# Patient Record
Sex: FEMALE | Race: WHITE | Employment: OTHER | URBAN - NONMETROPOLITAN AREA
[De-identification: names, ages, dates, MRNs, and addresses within clinical notes are randomized per-mention and may not be internally consistent; named-entity substitution may affect disease eponyms.]

---

## 2017-06-05 DIAGNOSIS — I10 ESSENTIAL HYPERTENSION: ICD-10-CM

## 2017-06-05 RX ORDER — BENAZEPRIL HYDROCHLORIDE 20 MG/1
20 TABLET ORAL DAILY
Qty: 30 TABLET | Refills: 5 | Status: SHIPPED | OUTPATIENT
Start: 2017-06-05 | End: 2018-02-19 | Stop reason: SDUPTHER

## 2018-02-19 DIAGNOSIS — I10 ESSENTIAL HYPERTENSION: ICD-10-CM

## 2018-02-19 RX ORDER — BENAZEPRIL HYDROCHLORIDE 20 MG/1
20 TABLET ORAL DAILY
Qty: 30 TABLET | Refills: 5 | Status: SHIPPED | OUTPATIENT
Start: 2018-02-19 | End: 2018-07-06 | Stop reason: SDUPTHER

## 2018-06-25 ENCOUNTER — OFFICE VISIT (OUTPATIENT)
Dept: FAMILY MEDICINE CLINIC | Age: 68
End: 2018-06-25
Payer: MEDICARE

## 2018-06-25 VITALS
HEART RATE: 107 BPM | TEMPERATURE: 98.5 F | SYSTOLIC BLOOD PRESSURE: 150 MMHG | DIASTOLIC BLOOD PRESSURE: 80 MMHG | HEIGHT: 59 IN | OXYGEN SATURATION: 98 % | BODY MASS INDEX: 23.26 KG/M2 | WEIGHT: 115.4 LBS

## 2018-06-25 DIAGNOSIS — S86.911A KNEE STRAIN, RIGHT, INITIAL ENCOUNTER: Primary | ICD-10-CM

## 2018-06-25 DIAGNOSIS — Z12.11 COLON CANCER SCREENING: ICD-10-CM

## 2018-06-25 DIAGNOSIS — S82.141A CLOSED FRACTURE OF RIGHT TIBIAL PLATEAU, INITIAL ENCOUNTER: Primary | ICD-10-CM

## 2018-06-25 DIAGNOSIS — Z91.81 AT HIGH RISK FOR FALLS: ICD-10-CM

## 2018-06-25 PROCEDURE — G8420 CALC BMI NORM PARAMETERS: HCPCS | Performed by: NURSE PRACTITIONER

## 2018-06-25 PROCEDURE — 3017F COLORECTAL CA SCREEN DOC REV: CPT | Performed by: NURSE PRACTITIONER

## 2018-06-25 PROCEDURE — G8400 PT W/DXA NO RESULTS DOC: HCPCS | Performed by: NURSE PRACTITIONER

## 2018-06-25 PROCEDURE — 4040F PNEUMOC VAC/ADMIN/RCVD: CPT | Performed by: NURSE PRACTITIONER

## 2018-06-25 PROCEDURE — G8427 DOCREV CUR MEDS BY ELIG CLIN: HCPCS | Performed by: NURSE PRACTITIONER

## 2018-06-25 PROCEDURE — 4004F PT TOBACCO SCREEN RCVD TLK: CPT | Performed by: NURSE PRACTITIONER

## 2018-06-25 PROCEDURE — 1123F ACP DISCUSS/DSCN MKR DOCD: CPT | Performed by: NURSE PRACTITIONER

## 2018-06-25 PROCEDURE — 1090F PRES/ABSN URINE INCON ASSESS: CPT | Performed by: NURSE PRACTITIONER

## 2018-06-25 PROCEDURE — 99213 OFFICE O/P EST LOW 20 MIN: CPT | Performed by: NURSE PRACTITIONER

## 2018-06-25 ASSESSMENT — ENCOUNTER SYMPTOMS
SHORTNESS OF BREATH: 0
COUGH: 0

## 2018-06-25 ASSESSMENT — PATIENT HEALTH QUESTIONNAIRE - PHQ9
1. LITTLE INTEREST OR PLEASURE IN DOING THINGS: 0
SUM OF ALL RESPONSES TO PHQ QUESTIONS 1-9: 1
SUM OF ALL RESPONSES TO PHQ9 QUESTIONS 1 & 2: 1
2. FEELING DOWN, DEPRESSED OR HOPELESS: 1

## 2018-07-06 ENCOUNTER — OFFICE VISIT (OUTPATIENT)
Dept: FAMILY MEDICINE CLINIC | Age: 68
End: 2018-07-06
Payer: MEDICARE

## 2018-07-06 VITALS
WEIGHT: 114.6 LBS | HEART RATE: 101 BPM | OXYGEN SATURATION: 99 % | DIASTOLIC BLOOD PRESSURE: 90 MMHG | BODY MASS INDEX: 23.1 KG/M2 | HEIGHT: 59 IN | SYSTOLIC BLOOD PRESSURE: 162 MMHG

## 2018-07-06 DIAGNOSIS — I10 ESSENTIAL HYPERTENSION: ICD-10-CM

## 2018-07-06 DIAGNOSIS — F32.A DEPRESSION, UNSPECIFIED DEPRESSION TYPE: ICD-10-CM

## 2018-07-06 DIAGNOSIS — Z12.39 SCREENING FOR MALIGNANT NEOPLASM OF BREAST: ICD-10-CM

## 2018-07-06 DIAGNOSIS — Z78.0 POSTMENOPAUSAL: ICD-10-CM

## 2018-07-06 DIAGNOSIS — S82.141A TIBIAL PLATEAU FRACTURE, RIGHT, CLOSED, INITIAL ENCOUNTER: Primary | ICD-10-CM

## 2018-07-06 LAB
ALBUMIN SERPL-MCNC: 3.8 G/DL (ref 3.9–4.9)
ALP BLD-CCNC: 122 U/L (ref 40–130)
ALT SERPL-CCNC: 8 U/L (ref 0–33)
ANION GAP SERPL CALCULATED.3IONS-SCNC: 15 MEQ/L (ref 7–13)
AST SERPL-CCNC: 21 U/L (ref 0–35)
BILIRUB SERPL-MCNC: 0.6 MG/DL (ref 0–1.2)
BUN BLDV-MCNC: 13 MG/DL (ref 8–23)
CALCIUM SERPL-MCNC: 9.5 MG/DL (ref 8.6–10.2)
CHLORIDE BLD-SCNC: 103 MEQ/L (ref 98–107)
CHOLESTEROL, TOTAL: 220 MG/DL (ref 0–199)
CO2: 21 MEQ/L (ref 22–29)
CREAT SERPL-MCNC: 0.45 MG/DL (ref 0.5–0.9)
GFR AFRICAN AMERICAN: >60
GFR NON-AFRICAN AMERICAN: >60
GLOBULIN: 3.2 G/DL (ref 2.3–3.5)
GLUCOSE BLD-MCNC: 92 MG/DL (ref 74–109)
HCT VFR BLD CALC: 42 % (ref 37–47)
HDLC SERPL-MCNC: 61 MG/DL (ref 40–59)
HEMOGLOBIN: 13.7 G/DL (ref 12–16)
LDL CHOLESTEROL CALCULATED: 132 MG/DL (ref 0–129)
MCH RBC QN AUTO: 34.4 PG (ref 27–31.3)
MCHC RBC AUTO-ENTMCNC: 32.6 % (ref 33–37)
MCV RBC AUTO: 105.7 FL (ref 82–100)
PDW BLD-RTO: 14.8 % (ref 11.5–14.5)
PLATELET # BLD: 196 K/UL (ref 130–400)
POTASSIUM SERPL-SCNC: 3.9 MEQ/L (ref 3.5–5.1)
RBC # BLD: 3.97 M/UL (ref 4.2–5.4)
SODIUM BLD-SCNC: 139 MEQ/L (ref 132–144)
TOTAL PROTEIN: 7 G/DL (ref 6.4–8.1)
TRIGL SERPL-MCNC: 137 MG/DL (ref 0–200)
TSH SERPL DL<=0.05 MIU/L-ACNC: 1.15 UIU/ML (ref 0.27–4.2)
WBC # BLD: 2.7 K/UL (ref 4.8–10.8)

## 2018-07-06 PROCEDURE — 1090F PRES/ABSN URINE INCON ASSESS: CPT | Performed by: FAMILY MEDICINE

## 2018-07-06 PROCEDURE — 4040F PNEUMOC VAC/ADMIN/RCVD: CPT | Performed by: FAMILY MEDICINE

## 2018-07-06 PROCEDURE — G8427 DOCREV CUR MEDS BY ELIG CLIN: HCPCS | Performed by: FAMILY MEDICINE

## 2018-07-06 PROCEDURE — 99214 OFFICE O/P EST MOD 30 MIN: CPT | Performed by: FAMILY MEDICINE

## 2018-07-06 PROCEDURE — 4004F PT TOBACCO SCREEN RCVD TLK: CPT | Performed by: FAMILY MEDICINE

## 2018-07-06 PROCEDURE — G8420 CALC BMI NORM PARAMETERS: HCPCS | Performed by: FAMILY MEDICINE

## 2018-07-06 PROCEDURE — 1123F ACP DISCUSS/DSCN MKR DOCD: CPT | Performed by: FAMILY MEDICINE

## 2018-07-06 PROCEDURE — 3017F COLORECTAL CA SCREEN DOC REV: CPT | Performed by: FAMILY MEDICINE

## 2018-07-06 PROCEDURE — G8400 PT W/DXA NO RESULTS DOC: HCPCS | Performed by: FAMILY MEDICINE

## 2018-07-06 RX ORDER — ESCITALOPRAM OXALATE 10 MG/1
10 TABLET ORAL DAILY
Qty: 30 TABLET | Refills: 5 | Status: SHIPPED | OUTPATIENT
Start: 2018-07-06 | End: 2019-04-08 | Stop reason: SDUPTHER

## 2018-07-06 RX ORDER — BENAZEPRIL HYDROCHLORIDE 20 MG/1
20 TABLET ORAL 2 TIMES DAILY
Qty: 60 TABLET | Refills: 5 | Status: SHIPPED | OUTPATIENT
Start: 2018-07-06 | End: 2019-04-08 | Stop reason: SDUPTHER

## 2018-07-06 RX ORDER — HYDROCODONE BITARTRATE AND ACETAMINOPHEN 5; 325 MG/1; MG/1
1 TABLET ORAL EVERY 6 HOURS PRN
Qty: 28 TABLET | Refills: 0 | Status: SHIPPED | OUTPATIENT
Start: 2018-07-06 | End: 2018-07-13

## 2018-07-27 ENCOUNTER — HOSPITAL ENCOUNTER (OUTPATIENT)
Dept: WOMENS IMAGING | Age: 68
Discharge: HOME | End: 2018-07-29
Payer: MEDICARE

## 2018-07-27 DIAGNOSIS — Z12.39 SCREENING FOR MALIGNANT NEOPLASM OF BREAST: ICD-10-CM

## 2018-07-27 DIAGNOSIS — Z78.0 POSTMENOPAUSAL: ICD-10-CM

## 2018-07-27 PROCEDURE — 77067 SCR MAMMO BI INCL CAD: CPT

## 2018-07-27 PROCEDURE — 77080 DXA BONE DENSITY AXIAL: CPT

## 2018-08-29 PROBLEM — M81.0 OSTEOPOROSIS: Status: ACTIVE | Noted: 2018-08-29

## 2018-08-30 ENCOUNTER — HOSPITAL ENCOUNTER (OUTPATIENT)
Dept: INFUSION THERAPY | Age: 68
Setting detail: INFUSION SERIES
Discharge: HOME | End: 2018-08-30
Payer: MEDICARE

## 2018-08-30 VITALS
HEART RATE: 72 BPM | RESPIRATION RATE: 18 BRPM | DIASTOLIC BLOOD PRESSURE: 76 MMHG | SYSTOLIC BLOOD PRESSURE: 154 MMHG | TEMPERATURE: 98.2 F

## 2018-08-30 DIAGNOSIS — M81.0 OSTEOPOROSIS, UNSPECIFIED OSTEOPOROSIS TYPE, UNSPECIFIED PATHOLOGICAL FRACTURE PRESENCE: ICD-10-CM

## 2018-08-30 PROCEDURE — 96401 CHEMO ANTI-NEOPL SQ/IM: CPT

## 2018-08-30 PROCEDURE — 6360000002 HC RX W HCPCS: Performed by: FAMILY MEDICINE

## 2018-08-30 RX ADMIN — DENOSUMAB 60 MG: 60 INJECTION SUBCUTANEOUS at 13:15

## 2018-08-30 NOTE — FLOWSHEET NOTE
Patient to the floor ambulatory for a prolia injection. Vital signs taken. Denies any discomfort. Education given. Call light is within reach.

## 2018-08-30 NOTE — FLOWSHEET NOTE
Injection given in right arm. Tolerated well. Ambulated off of the unit. All equipment used in the care for this patient has been cleaned.

## 2019-02-02 ENCOUNTER — OFFICE VISIT (OUTPATIENT)
Dept: FAMILY MEDICINE CLINIC | Age: 69
End: 2019-02-02
Payer: MEDICARE

## 2019-02-02 VITALS
SYSTOLIC BLOOD PRESSURE: 136 MMHG | BODY MASS INDEX: 22.22 KG/M2 | TEMPERATURE: 97.6 F | DIASTOLIC BLOOD PRESSURE: 80 MMHG | OXYGEN SATURATION: 98 % | RESPIRATION RATE: 16 BRPM | WEIGHT: 110.2 LBS | HEIGHT: 59 IN | HEART RATE: 101 BPM

## 2019-02-02 DIAGNOSIS — J06.9 ACUTE UPPER RESPIRATORY INFECTION: ICD-10-CM

## 2019-02-02 DIAGNOSIS — J02.9 SORE THROAT: ICD-10-CM

## 2019-02-02 DIAGNOSIS — J02.9 ACUTE PHARYNGITIS, UNSPECIFIED ETIOLOGY: Primary | ICD-10-CM

## 2019-02-02 DIAGNOSIS — H61.21 RIGHT EAR IMPACTED CERUMEN: ICD-10-CM

## 2019-02-02 DIAGNOSIS — H92.01 RIGHT EAR PAIN: ICD-10-CM

## 2019-02-02 LAB — S PYO AG THROAT QL: NORMAL

## 2019-02-02 PROCEDURE — 99213 OFFICE O/P EST LOW 20 MIN: CPT | Performed by: NURSE PRACTITIONER

## 2019-02-02 PROCEDURE — 69210 REMOVE IMPACTED EAR WAX UNI: CPT | Performed by: NURSE PRACTITIONER

## 2019-02-02 PROCEDURE — 87880 STREP A ASSAY W/OPTIC: CPT | Performed by: NURSE PRACTITIONER

## 2019-02-02 RX ORDER — OFLOXACIN 3 MG/ML
5 SOLUTION AURICULAR (OTIC) 2 TIMES DAILY
Qty: 1 BOTTLE | Refills: 0 | Status: SHIPPED | OUTPATIENT
Start: 2019-02-02 | End: 2019-05-09

## 2019-02-02 RX ORDER — CEFDINIR 300 MG/1
300 CAPSULE ORAL 2 TIMES DAILY
Qty: 20 CAPSULE | Refills: 0 | Status: SHIPPED | OUTPATIENT
Start: 2019-02-02 | End: 2019-02-12

## 2019-02-02 ASSESSMENT — ENCOUNTER SYMPTOMS
SHORTNESS OF BREATH: 0
WHEEZING: 0
COUGH: 1
SINUS PRESSURE: 1
RHINORRHEA: 0
SINUS PAIN: 0
SORE THROAT: 1

## 2019-02-05 LAB — THROAT CULTURE: NORMAL

## 2019-04-08 DIAGNOSIS — F32.A DEPRESSION, UNSPECIFIED DEPRESSION TYPE: ICD-10-CM

## 2019-04-08 DIAGNOSIS — I10 ESSENTIAL HYPERTENSION: ICD-10-CM

## 2019-04-08 RX ORDER — ESCITALOPRAM OXALATE 10 MG/1
10 TABLET ORAL DAILY
Qty: 30 TABLET | Refills: 5 | Status: SHIPPED | OUTPATIENT
Start: 2019-04-08 | End: 2019-09-27 | Stop reason: SDUPTHER

## 2019-04-08 RX ORDER — BENAZEPRIL HYDROCHLORIDE 20 MG/1
TABLET ORAL
Qty: 60 TABLET | Refills: 5 | Status: SHIPPED | OUTPATIENT
Start: 2019-04-08 | End: 2019-05-22 | Stop reason: SDUPTHER

## 2019-05-09 ENCOUNTER — OFFICE VISIT (OUTPATIENT)
Dept: FAMILY MEDICINE CLINIC | Age: 69
End: 2019-05-09
Payer: MEDICARE

## 2019-05-09 VITALS
SYSTOLIC BLOOD PRESSURE: 130 MMHG | OXYGEN SATURATION: 98 % | WEIGHT: 112.4 LBS | HEIGHT: 59 IN | DIASTOLIC BLOOD PRESSURE: 82 MMHG | BODY MASS INDEX: 22.66 KG/M2 | HEART RATE: 90 BPM

## 2019-05-09 DIAGNOSIS — G89.29 CHRONIC PAIN OF BOTH KNEES: ICD-10-CM

## 2019-05-09 DIAGNOSIS — I10 ESSENTIAL HYPERTENSION: Primary | ICD-10-CM

## 2019-05-09 DIAGNOSIS — M81.0 OSTEOPOROSIS, UNSPECIFIED OSTEOPOROSIS TYPE, UNSPECIFIED PATHOLOGICAL FRACTURE PRESENCE: ICD-10-CM

## 2019-05-09 DIAGNOSIS — Z12.11 COLON CANCER SCREENING: ICD-10-CM

## 2019-05-09 DIAGNOSIS — I10 ESSENTIAL HYPERTENSION: ICD-10-CM

## 2019-05-09 DIAGNOSIS — M25.561 CHRONIC PAIN OF BOTH KNEES: ICD-10-CM

## 2019-05-09 DIAGNOSIS — F32.A DEPRESSION, UNSPECIFIED DEPRESSION TYPE: ICD-10-CM

## 2019-05-09 DIAGNOSIS — M25.562 CHRONIC PAIN OF BOTH KNEES: ICD-10-CM

## 2019-05-09 LAB
ALBUMIN SERPL-MCNC: 4 G/DL (ref 3.5–4.6)
ALP BLD-CCNC: 82 U/L (ref 40–130)
ALT SERPL-CCNC: 6 U/L (ref 0–33)
ANION GAP SERPL CALCULATED.3IONS-SCNC: 16 MEQ/L (ref 9–15)
AST SERPL-CCNC: 19 U/L (ref 0–35)
BILIRUB SERPL-MCNC: 0.5 MG/DL (ref 0.2–0.7)
BUN BLDV-MCNC: 15 MG/DL (ref 8–23)
CALCIUM SERPL-MCNC: 9.1 MG/DL (ref 8.5–9.9)
CHLORIDE BLD-SCNC: 103 MEQ/L (ref 95–107)
CHOLESTEROL, TOTAL: 193 MG/DL (ref 0–199)
CO2: 23 MEQ/L (ref 20–31)
CREAT SERPL-MCNC: 0.63 MG/DL (ref 0.5–0.9)
GFR AFRICAN AMERICAN: >60
GFR NON-AFRICAN AMERICAN: >60
GLOBULIN: 2.8 G/DL (ref 2.3–3.5)
GLUCOSE BLD-MCNC: 91 MG/DL (ref 70–99)
HCT VFR BLD CALC: 38.8 % (ref 37–47)
HDLC SERPL-MCNC: 58 MG/DL (ref 40–59)
HEMOGLOBIN: 12.7 G/DL (ref 12–16)
LDL CHOLESTEROL CALCULATED: 105 MG/DL (ref 0–129)
MCH RBC QN AUTO: 35.6 PG (ref 27–31.3)
MCHC RBC AUTO-ENTMCNC: 32.8 % (ref 33–37)
MCV RBC AUTO: 108.7 FL (ref 82–100)
PDW BLD-RTO: 15.6 % (ref 11.5–14.5)
PLATELET # BLD: 157 K/UL (ref 130–400)
POTASSIUM SERPL-SCNC: 4.6 MEQ/L (ref 3.4–4.9)
RBC # BLD: 3.57 M/UL (ref 4.2–5.4)
SODIUM BLD-SCNC: 142 MEQ/L (ref 135–144)
TOTAL PROTEIN: 6.8 G/DL (ref 6.3–8)
TRIGL SERPL-MCNC: 150 MG/DL (ref 0–150)
VITAMIN D 25-HYDROXY: 13.9 NG/ML (ref 30–100)
WBC # BLD: 2.7 K/UL (ref 4.8–10.8)

## 2019-05-09 PROCEDURE — 1123F ACP DISCUSS/DSCN MKR DOCD: CPT | Performed by: FAMILY MEDICINE

## 2019-05-09 PROCEDURE — 4004F PT TOBACCO SCREEN RCVD TLK: CPT | Performed by: FAMILY MEDICINE

## 2019-05-09 PROCEDURE — G8420 CALC BMI NORM PARAMETERS: HCPCS | Performed by: FAMILY MEDICINE

## 2019-05-09 PROCEDURE — 99214 OFFICE O/P EST MOD 30 MIN: CPT | Performed by: FAMILY MEDICINE

## 2019-05-09 PROCEDURE — 1090F PRES/ABSN URINE INCON ASSESS: CPT | Performed by: FAMILY MEDICINE

## 2019-05-09 PROCEDURE — 3017F COLORECTAL CA SCREEN DOC REV: CPT | Performed by: FAMILY MEDICINE

## 2019-05-09 PROCEDURE — G8427 DOCREV CUR MEDS BY ELIG CLIN: HCPCS | Performed by: FAMILY MEDICINE

## 2019-05-09 PROCEDURE — 4040F PNEUMOC VAC/ADMIN/RCVD: CPT | Performed by: FAMILY MEDICINE

## 2019-05-09 PROCEDURE — G8399 PT W/DXA RESULTS DOCUMENT: HCPCS | Performed by: FAMILY MEDICINE

## 2019-05-09 ASSESSMENT — PATIENT HEALTH QUESTIONNAIRE - PHQ9
SUM OF ALL RESPONSES TO PHQ9 QUESTIONS 1 & 2: 0
2. FEELING DOWN, DEPRESSED OR HOPELESS: 0
1. LITTLE INTEREST OR PLEASURE IN DOING THINGS: 0
SUM OF ALL RESPONSES TO PHQ QUESTIONS 1-9: 0
SUM OF ALL RESPONSES TO PHQ QUESTIONS 1-9: 0

## 2019-05-09 NOTE — PROGRESS NOTES
Chief Complaint   Patient presents with    Hypertension     check up       HPI:  Mark Land is a 80 y.o. female    checkup  Hx depression and HTN    Doing ok on current meds    Ongoing knee pain    She has been feeling pretty good lately she states    Hx vertigo, treats with dramamine    Infrequent doctor visits      Past Medical History:   Diagnosis Date    Headache     Hypertension     Vertigo      Past Surgical History:   Procedure Laterality Date    ELBOW SURGERY Right 8/2012    Dr. Jimmie Scales       History reviewed. No pertinent family history. Social History     Socioeconomic History    Marital status:      Spouse name: None    Number of children: None    Years of education: None    Highest education level: None   Occupational History    None   Social Needs    Financial resource strain: None    Food insecurity:     Worry: None     Inability: None    Transportation needs:     Medical: None     Non-medical: None   Tobacco Use    Smoking status: Current Some Day Smoker     Packs/day: 0.25     Years: 3.00     Pack years: 0.75    Smokeless tobacco: Never Used   Substance and Sexual Activity    Alcohol use:  Yes     Alcohol/week: 0.0 oz    Drug use: No    Sexual activity: None   Lifestyle    Physical activity:     Days per week: None     Minutes per session: None    Stress: None   Relationships    Social connections:     Talks on phone: None     Gets together: None     Attends [employer]ism service: None     Active member of club or organization: None     Attends meetings of clubs or organizations: None     Relationship status: None    Intimate partner violence:     Fear of current or ex partner: None     Emotionally abused: None     Physically abused: None     Forced sexual activity: None   Other Topics Concern    None   Social History Narrative    None     Current Outpatient Medications   Medication Sig Dispense Refill    Handicap Placard MISC by Does not apply route Exp 5 years 1 each 0    escitalopram (LEXAPRO) 10 MG tablet Take 1 tablet by mouth daily 30 tablet 5    benazepril (LOTENSIN) 20 MG tablet TAKE 1 TABLET BY MOUTH TWICE DAILY 60 tablet 5    Handicap Placard MISC by Does not apply route Exp 1 year 1 each 0    Aspirin-Acetaminophen-Caffeine (EXCEDRIN MIGRAINE PO) Take by mouth      naproxen (NAPROSYN) 250 MG tablet Take 250 mg by mouth 2 times daily (with meals)       No current facility-administered medications for this visit. No Known Allergies    Review of Systems:  Psych per HPI   General ROS: negative for - chills, fatigue, fever, malaise, weight gain or weight loss  Respiratory ROS: no cough, shortness of breath, or wheezing  Cardiovascular ROS: no chest pain or dyspnea on exertion  Gastrointestinal ROS: no abdominal pain, change in bowel habits, or black or bloody stools  Genito-Urinary ROS: per HPI  Musculoskeletal ROS: occ aches and pains/headache, chronic right elbow pain s/p replacement  Neurological ROS: negative for - behavioral changes, memory loss, numbness/tingling, tremors or weakness    In general patient otherwise reports feeling well. Physical Exam:  /82 (Site: Left Upper Arm)   Pulse 90   Ht 4' 11\" (1.499 m)   Wt 112 lb 6.4 oz (51 kg)   SpO2 98%   Breastfeeding? No   BMI 22.70 kg/m²     Gen: Well, NAD, Alert, Oriented x 3   HEENT: EOMI, eyes clear, MMM  Skin: without rash or jaundice  Neck: no significant lymphadenopathy or thyromegaly  Lungs: CTA B w/out Rales/Wheezes/Rhonchi, Good respiratory effort   Heart: RRR, S1S2, w/out M/R/G, non-displaced PMI   Abdomen: Soft NT/ND, w/out R/G, w/ +BSx4   Ext: No C/C/E Bilaterally. Neuro: Neurovascularly intact w/ Sensory/Motor intact UE/LE Bilaterally. S/p right elbow replacement  Psych: euthymic,admits depression, no SI/HI      A&P   Diagnosis Orders   1. Essential hypertension  CBC    Comprehensive Metabolic Panel    Lipid Panel   2.  Depression, unspecified depression type 3. Colon cancer screening  COLOGUARD   4. Chronic pain of both knees  Handicap Placard MISC   5.  Osteoporosis, unspecified osteoporosis type, unspecified pathological fracture presence         Continue lexapro 10mg  Benazepril 20mg  Fasting labs and screens  Consider counseling if needed    Due for Kelvin Mar MD

## 2019-05-22 DIAGNOSIS — I10 ESSENTIAL HYPERTENSION: ICD-10-CM

## 2019-05-22 RX ORDER — BENAZEPRIL HYDROCHLORIDE 20 MG/1
TABLET ORAL
Qty: 60 TABLET | Refills: 5 | Status: SHIPPED | OUTPATIENT
Start: 2019-05-22 | End: 2019-10-28 | Stop reason: SDUPTHER

## 2019-05-30 ENCOUNTER — HOSPITAL ENCOUNTER (OUTPATIENT)
Dept: INFUSION THERAPY | Age: 69
Setting detail: INFUSION SERIES
Discharge: HOME | End: 2019-05-30
Payer: MEDICARE

## 2019-05-30 VITALS
DIASTOLIC BLOOD PRESSURE: 71 MMHG | TEMPERATURE: 97.5 F | RESPIRATION RATE: 18 BRPM | SYSTOLIC BLOOD PRESSURE: 166 MMHG | HEART RATE: 81 BPM

## 2019-05-30 DIAGNOSIS — M81.0 OSTEOPOROSIS, UNSPECIFIED OSTEOPOROSIS TYPE, UNSPECIFIED PATHOLOGICAL FRACTURE PRESENCE: Primary | ICD-10-CM

## 2019-05-30 PROCEDURE — 96401 CHEMO ANTI-NEOPL SQ/IM: CPT

## 2019-05-30 PROCEDURE — 6360000002 HC RX W HCPCS: Performed by: FAMILY MEDICINE

## 2019-05-30 RX ADMIN — DENOSUMAB 60 MG: 60 INJECTION SUBCUTANEOUS at 10:13

## 2019-05-30 NOTE — PROGRESS NOTES
Injection given left upper arm  Tolerated well  Left unit walking  All equipment used in the care for this patient has been cleaned.

## 2019-06-06 DIAGNOSIS — R19.5 POSITIVE COLORECTAL CANCER SCREENING USING COLOGUARD TEST: Primary | ICD-10-CM

## 2019-06-06 DIAGNOSIS — Z12.11 COLON CANCER SCREENING: ICD-10-CM

## 2019-07-19 ENCOUNTER — TELEPHONE (OUTPATIENT)
Dept: FAMILY MEDICINE CLINIC | Age: 69
End: 2019-07-19

## 2019-09-27 DIAGNOSIS — F32.A DEPRESSION, UNSPECIFIED DEPRESSION TYPE: ICD-10-CM

## 2019-09-30 RX ORDER — ESCITALOPRAM OXALATE 10 MG/1
10 TABLET ORAL DAILY
Qty: 30 TABLET | Refills: 5 | Status: SHIPPED | OUTPATIENT
Start: 2019-09-30 | End: 2019-10-25 | Stop reason: SDUPTHER

## 2019-10-25 DIAGNOSIS — F32.A DEPRESSION, UNSPECIFIED DEPRESSION TYPE: ICD-10-CM

## 2019-10-25 RX ORDER — ESCITALOPRAM OXALATE 10 MG/1
10 TABLET ORAL DAILY
Qty: 30 TABLET | Refills: 5 | Status: SHIPPED | OUTPATIENT
Start: 2019-10-25 | End: 2020-05-04

## 2019-10-28 DIAGNOSIS — I10 ESSENTIAL HYPERTENSION: ICD-10-CM

## 2019-10-28 RX ORDER — BENAZEPRIL HYDROCHLORIDE 20 MG/1
TABLET ORAL
Qty: 60 TABLET | Refills: 5 | Status: SHIPPED | OUTPATIENT
Start: 2019-10-28 | End: 2020-05-04

## 2019-12-02 ENCOUNTER — HOSPITAL ENCOUNTER (OUTPATIENT)
Dept: INFUSION THERAPY | Age: 69
Setting detail: INFUSION SERIES
Discharge: HOME | End: 2019-12-02
Payer: MEDICARE

## 2019-12-02 VITALS
SYSTOLIC BLOOD PRESSURE: 168 MMHG | DIASTOLIC BLOOD PRESSURE: 95 MMHG | TEMPERATURE: 97.7 F | HEART RATE: 76 BPM | RESPIRATION RATE: 16 BRPM

## 2019-12-02 DIAGNOSIS — M81.0 OSTEOPOROSIS, UNSPECIFIED OSTEOPOROSIS TYPE, UNSPECIFIED PATHOLOGICAL FRACTURE PRESENCE: Primary | ICD-10-CM

## 2019-12-02 PROCEDURE — 6360000002 HC RX W HCPCS: Performed by: FAMILY MEDICINE

## 2019-12-02 PROCEDURE — 96401 CHEMO ANTI-NEOPL SQ/IM: CPT

## 2019-12-02 RX ADMIN — DENOSUMAB 60 MG: 60 INJECTION SUBCUTANEOUS at 10:19

## 2020-05-04 RX ORDER — BENAZEPRIL HYDROCHLORIDE 20 MG/1
TABLET ORAL
Qty: 60 TABLET | Refills: 5 | Status: SHIPPED | OUTPATIENT
Start: 2020-05-04 | End: 2020-05-12 | Stop reason: SDUPTHER

## 2020-05-04 RX ORDER — ESCITALOPRAM OXALATE 10 MG/1
10 TABLET ORAL DAILY
Qty: 30 TABLET | Refills: 5 | Status: SHIPPED | OUTPATIENT
Start: 2020-05-04 | End: 2020-05-12 | Stop reason: SDUPTHER

## 2020-05-12 ENCOUNTER — VIRTUAL VISIT (OUTPATIENT)
Dept: FAMILY MEDICINE CLINIC | Age: 70
End: 2020-05-12
Payer: MEDICARE

## 2020-05-12 VITALS
WEIGHT: 105 LBS | SYSTOLIC BLOOD PRESSURE: 141 MMHG | BODY MASS INDEX: 21.17 KG/M2 | HEIGHT: 59 IN | HEART RATE: 86 BPM | DIASTOLIC BLOOD PRESSURE: 76 MMHG

## 2020-05-12 PROCEDURE — G8420 CALC BMI NORM PARAMETERS: HCPCS | Performed by: FAMILY MEDICINE

## 2020-05-12 PROCEDURE — 99214 OFFICE O/P EST MOD 30 MIN: CPT | Performed by: FAMILY MEDICINE

## 2020-05-12 PROCEDURE — 1090F PRES/ABSN URINE INCON ASSESS: CPT | Performed by: FAMILY MEDICINE

## 2020-05-12 PROCEDURE — G8427 DOCREV CUR MEDS BY ELIG CLIN: HCPCS | Performed by: FAMILY MEDICINE

## 2020-05-12 PROCEDURE — 4040F PNEUMOC VAC/ADMIN/RCVD: CPT | Performed by: FAMILY MEDICINE

## 2020-05-12 PROCEDURE — G8399 PT W/DXA RESULTS DOCUMENT: HCPCS | Performed by: FAMILY MEDICINE

## 2020-05-12 PROCEDURE — 3017F COLORECTAL CA SCREEN DOC REV: CPT | Performed by: FAMILY MEDICINE

## 2020-05-12 PROCEDURE — 1123F ACP DISCUSS/DSCN MKR DOCD: CPT | Performed by: FAMILY MEDICINE

## 2020-05-12 PROCEDURE — 4004F PT TOBACCO SCREEN RCVD TLK: CPT | Performed by: FAMILY MEDICINE

## 2020-05-12 RX ORDER — BENAZEPRIL HYDROCHLORIDE 20 MG/1
TABLET ORAL
Qty: 60 TABLET | Refills: 5 | Status: CANCELLED | OUTPATIENT
Start: 2020-05-12

## 2020-05-12 RX ORDER — IBUPROFEN 800 MG/1
800 TABLET ORAL
Qty: 90 TABLET | Refills: 5 | Status: SHIPPED | OUTPATIENT
Start: 2020-05-12 | End: 2020-10-02

## 2020-05-12 RX ORDER — BENAZEPRIL HYDROCHLORIDE 20 MG/1
TABLET ORAL
Qty: 60 TABLET | Refills: 5 | Status: SHIPPED | OUTPATIENT
Start: 2020-05-12 | End: 2020-10-30

## 2020-05-12 RX ORDER — ESCITALOPRAM OXALATE 10 MG/1
10 TABLET ORAL DAILY
Qty: 30 TABLET | Refills: 5 | Status: SHIPPED | OUTPATIENT
Start: 2020-05-12 | End: 2020-11-02

## 2020-05-12 RX ORDER — HYDROCHLOROTHIAZIDE 25 MG/1
25 TABLET ORAL EVERY MORNING
Qty: 30 TABLET | Refills: 5 | Status: SHIPPED | OUTPATIENT
Start: 2020-05-12 | End: 2020-10-02

## 2020-05-12 NOTE — PROGRESS NOTES
2020    TELEHEALTH EVALUATION -- Audio/Visual (During PUYIP-91 public health emergency)    Due to Mat[place] 19 outbreak, patient's office visit was converted to a virtual visit. Patient was contacted and agreed to proceed with a virtual visit via [email or web address]  The risks and benefits of converting to a virtual visit were discussed in light of the current infectious disease epidemic. Patient also understood that insurance coverage and co-pays are up to their individual insurance plans.     HPI:    Lindsay Vasquez (:  1950) has requested an audio/video evaluation for the following concern(s):  Chief Complaint   Patient presents with    Hypertension     check up    Discuss Medications     excedrin and naptoxen nit working taking 6 ibuprofen to be able to walk, friend gave her 600mg ibuprofen adnd that helped more       checkup  Hx depression and HTN     Doing ok on current meds     Ongoing knee pain  Would like 800mg motrin rx     She has been feeling pretty good lately she states     Hx vertigo, treats with dramamine     Infrequent doctor visits    bp running high    Patient Active Problem List   Diagnosis    Osteoporosis    Essential hypertension    Depression    Chronic pain of both knees     Past Medical History:   Diagnosis Date    Headache     Hypertension     Vertigo          Review of Systems       Review of Systems:   General ROS: negative for - nausea, vomiting, chills, fatigue, fever, malaise, weight gain or weight loss  Respiratory ROS: no cough, shortness of breath, or wheezing  Cardiovascular ROS: no chest pain or dyspnea on exertion  Gastrointestinal ROS: no abdominal pain, change in bowel habits, or black or bloody stools  Genito-Urinary ROS: no dysuria, trouble voiding, or hematuria  Musculoskeletal ROS: negative for - gait disturbance, joint pain or joint stiffness  Neurological ROS: negative for - behavioral changes, memory loss, numbness/tingling, tremors or weakness    In general

## 2020-08-11 ENCOUNTER — TELEPHONE (OUTPATIENT)
Dept: FAMILY MEDICINE CLINIC | Age: 70
End: 2020-08-11

## 2020-08-11 NOTE — TELEPHONE ENCOUNTER
[address] calling to see if pt is still getting Prolia injections.  Please call back [phone]

## 2020-08-14 ENCOUNTER — HOSPITAL ENCOUNTER (OUTPATIENT)
Dept: WOMENS IMAGING | Age: 70
Discharge: HOME | End: 2020-08-16
Payer: MEDICARE

## 2020-08-14 PROCEDURE — 77067 SCR MAMMO BI INCL CAD: CPT

## 2020-08-14 PROCEDURE — 77080 DXA BONE DENSITY AXIAL: CPT

## 2020-08-18 NOTE — TELEPHONE ENCOUNTER
She already has an open active treatment plan in her chart    She just skipped/canceled the June appt    Needs to reschedule

## 2020-10-01 ENCOUNTER — TELEPHONE (OUTPATIENT)
Dept: FAMILY MEDICINE CLINIC | Age: 70
End: 2020-10-01

## 2020-10-02 RX ORDER — HYDROCHLOROTHIAZIDE 25 MG/1
TABLET ORAL
Qty: 30 TABLET | Refills: 5 | Status: SHIPPED | OUTPATIENT
Start: 2020-10-02 | End: 2021-05-03

## 2020-10-02 RX ORDER — IBUPROFEN 800 MG/1
TABLET ORAL
Qty: 90 TABLET | Refills: 5 | Status: SHIPPED | OUTPATIENT
Start: 2020-10-02 | End: 2021-05-03

## 2020-11-02 RX ORDER — ESCITALOPRAM OXALATE 10 MG/1
10 TABLET ORAL DAILY
Qty: 30 TABLET | Refills: 5 | Status: SHIPPED | OUTPATIENT
Start: 2020-11-02 | End: 2021-05-11 | Stop reason: SDUPTHER

## 2020-11-02 RX ORDER — BENAZEPRIL HYDROCHLORIDE 20 MG/1
TABLET ORAL
Qty: 60 TABLET | Refills: 5 | Status: SHIPPED | OUTPATIENT
Start: 2020-11-02 | End: 2021-05-11 | Stop reason: SDUPTHER

## 2020-12-02 ENCOUNTER — TELEPHONE (OUTPATIENT)
Dept: FAMILY MEDICINE CLINIC | Age: 70
End: 2020-12-02

## 2021-04-30 DIAGNOSIS — G89.29 CHRONIC PAIN OF BOTH KNEES: ICD-10-CM

## 2021-04-30 DIAGNOSIS — M25.562 CHRONIC PAIN OF BOTH KNEES: ICD-10-CM

## 2021-04-30 DIAGNOSIS — M25.561 CHRONIC PAIN OF BOTH KNEES: ICD-10-CM

## 2021-04-30 DIAGNOSIS — I10 ESSENTIAL HYPERTENSION: ICD-10-CM

## 2021-05-03 RX ORDER — IBUPROFEN 800 MG/1
TABLET ORAL
Qty: 90 TABLET | Refills: 5 | Status: SHIPPED | OUTPATIENT
Start: 2021-05-03 | End: 2021-11-02

## 2021-05-03 RX ORDER — HYDROCHLOROTHIAZIDE 25 MG/1
TABLET ORAL
Qty: 30 TABLET | Refills: 5 | Status: SHIPPED | OUTPATIENT
Start: 2021-05-03 | End: 2021-11-02

## 2021-05-11 ENCOUNTER — OFFICE VISIT (OUTPATIENT)
Dept: FAMILY MEDICINE CLINIC | Age: 71
End: 2021-05-11
Payer: MEDICARE

## 2021-05-11 VITALS
BODY MASS INDEX: 20.6 KG/M2 | SYSTOLIC BLOOD PRESSURE: 150 MMHG | DIASTOLIC BLOOD PRESSURE: 90 MMHG | TEMPERATURE: 98.9 F | WEIGHT: 102.2 LBS | HEART RATE: 109 BPM | OXYGEN SATURATION: 94 % | HEIGHT: 59 IN

## 2021-05-11 DIAGNOSIS — F32.A DEPRESSION, UNSPECIFIED DEPRESSION TYPE: ICD-10-CM

## 2021-05-11 DIAGNOSIS — M81.0 OSTEOPOROSIS, UNSPECIFIED OSTEOPOROSIS TYPE, UNSPECIFIED PATHOLOGICAL FRACTURE PRESENCE: Primary | ICD-10-CM

## 2021-05-11 DIAGNOSIS — I10 ESSENTIAL HYPERTENSION: ICD-10-CM

## 2021-05-11 PROCEDURE — G8427 DOCREV CUR MEDS BY ELIG CLIN: HCPCS | Performed by: FAMILY MEDICINE

## 2021-05-11 PROCEDURE — 1090F PRES/ABSN URINE INCON ASSESS: CPT | Performed by: FAMILY MEDICINE

## 2021-05-11 PROCEDURE — 1123F ACP DISCUSS/DSCN MKR DOCD: CPT | Performed by: FAMILY MEDICINE

## 2021-05-11 PROCEDURE — 1036F TOBACCO NON-USER: CPT | Performed by: FAMILY MEDICINE

## 2021-05-11 PROCEDURE — 99214 OFFICE O/P EST MOD 30 MIN: CPT | Performed by: FAMILY MEDICINE

## 2021-05-11 PROCEDURE — 4040F PNEUMOC VAC/ADMIN/RCVD: CPT | Performed by: FAMILY MEDICINE

## 2021-05-11 PROCEDURE — 3017F COLORECTAL CA SCREEN DOC REV: CPT | Performed by: FAMILY MEDICINE

## 2021-05-11 PROCEDURE — G8420 CALC BMI NORM PARAMETERS: HCPCS | Performed by: FAMILY MEDICINE

## 2021-05-11 PROCEDURE — G8399 PT W/DXA RESULTS DOCUMENT: HCPCS | Performed by: FAMILY MEDICINE

## 2021-05-11 RX ORDER — AMLODIPINE BESYLATE 5 MG/1
5 TABLET ORAL DAILY
Qty: 30 TABLET | Refills: 5 | Status: SHIPPED | OUTPATIENT
Start: 2021-05-11 | End: 2021-12-01

## 2021-05-11 RX ORDER — ESCITALOPRAM OXALATE 10 MG/1
10 TABLET ORAL DAILY
Qty: 30 TABLET | Refills: 5 | Status: SHIPPED | OUTPATIENT
Start: 2021-05-11 | End: 2022-05-23 | Stop reason: DRUGHIGH

## 2021-05-11 RX ORDER — MULTIVIT-MIN/IRON/FOLIC ACID/K 18-600-40
CAPSULE ORAL
COMMUNITY

## 2021-05-11 RX ORDER — BENAZEPRIL HYDROCHLORIDE 20 MG/1
TABLET ORAL
Qty: 60 TABLET | Refills: 5 | Status: SHIPPED | OUTPATIENT
Start: 2021-05-11 | End: 2021-12-01

## 2021-05-11 RX ORDER — MULTIVIT WITH MINERALS/LUTEIN
250 TABLET ORAL DAILY
COMMUNITY

## 2021-05-11 RX ORDER — ESCITALOPRAM OXALATE 10 MG/1
10 TABLET ORAL 2 TIMES DAILY
Qty: 60 TABLET | Refills: 5 | Status: SHIPPED | OUTPATIENT
Start: 2021-05-11 | End: 2021-11-03 | Stop reason: SDUPTHER

## 2021-05-11 SDOH — ECONOMIC STABILITY: FOOD INSECURITY: WITHIN THE PAST 12 MONTHS, YOU WORRIED THAT YOUR FOOD WOULD RUN OUT BEFORE YOU GOT MONEY TO BUY MORE.: NEVER TRUE

## 2021-05-11 SDOH — ECONOMIC STABILITY: TRANSPORTATION INSECURITY
IN THE PAST 12 MONTHS, HAS THE LACK OF TRANSPORTATION KEPT YOU FROM MEDICAL APPOINTMENTS OR FROM GETTING MEDICATIONS?: NO

## 2021-05-11 NOTE — PROGRESS NOTES
Chief Complaint   Patient presents with    Hypertension     check up       HPI:  Karine Carrington is a 72 y.o. female    checkup  Hx depression and HTN      Home checks high    Due for labs     Ongoing knee pain    She has been feeling pretty good lately she states    Hx vertigo, treats with dramamine    Infrequent doctor visits    Works adult comfort care     No covid vaccine      Past Medical History:   Diagnosis Date    Headache     Hypertension     Vertigo      Past Surgical History:   Procedure Laterality Date    ELBOW SURGERY Right 2012    Dr. Huseyin Dee       History reviewed. No pertinent family history. Social History     Socioeconomic History    Marital status:      Spouse name: None    Number of children: None    Years of education: None    Highest education level: None   Occupational History    None   Social Needs    Financial resource strain: Not hard at all   [employer] insecurity     Worry: Never true     Inability: Never true    Transportation needs     Medical: No     Non-medical: No   Tobacco Use    Smoking status: Former Smoker     Packs/day: 0.25     Years: 3.00     Pack years: 0.75     Quit date: 2021     Years since quittin.1    Smokeless tobacco: Never Used   Substance and Sexual Activity    Alcohol use:  Yes     Alcohol/week: 0.0 standard drinks    Drug use: No    Sexual activity: None   Lifestyle    Physical activity     Days per week: None     Minutes per session: None    Stress: None   Relationships    Social connections     Talks on phone: None     Gets together: None     Attends Protestant service: None     Active member of club or organization: None     Attends meetings of clubs or organizations: None     Relationship status: None    Intimate partner violence     Fear of current or ex partner: None     Emotionally abused: None     Physically abused: None     Forced sexual activity: None   Other Topics Concern    None   Social History Narrative    None     Current Outpatient Medications   Medication Sig Dispense Refill    Ascorbic Acid (VITAMIN C) 250 MG tablet Take 250 mg by mouth daily      Cholecalciferol (VITAMIN D) 50 MCG (2000 UT) CAPS capsule Take by mouth      escitalopram (LEXAPRO) 10 MG tablet Take 1 tablet by mouth 2 times daily 60 tablet 5    benazepril (LOTENSIN) 20 MG tablet TAKE 1 TABLET BY MOUTH TWICE DAILY FOR 30 DAYS 60 tablet 5    amLODIPine (NORVASC) 5 MG tablet Take 1 tablet by mouth daily 30 tablet 5    hydroCHLOROthiazide (HYDRODIURIL) 25 MG tablet TAKE 1 TABLET BY MOUTH DAILY IN THE MORNING 30 tablet 5    ibuprofen (ADVIL;MOTRIN) 800 MG tablet TAKE 1 TABLET BY MOUTH THREE TIMES DAILY WITH MEALS 90 tablet 5    Handicap Placard MISC by Does not apply route Exp 1 year 1 each 0    naproxen (NAPROSYN) 250 MG tablet Take 250 mg by mouth 2 times daily (with meals)       No current facility-administered medications for this visit. No Known Allergies    Review of Systems:  Psych per HPI   General ROS: negative for - chills, fatigue, fever, malaise, weight gain or weight loss  Respiratory ROS: no cough, shortness of breath, or wheezing  Cardiovascular ROS: no chest pain or dyspnea on exertion  Gastrointestinal ROS: no abdominal pain, change in bowel habits, or black or bloody stools  Genito-Urinary ROS: per HPI  Musculoskeletal ROS: occ aches and pains/headache, chronic right elbow pain s/p replacement  Neurological ROS: negative for - behavioral changes, memory loss, numbness/tingling, tremors or weakness    In general patient otherwise reports feeling well.      Physical Exam:  BP (!) 150/90 (Site: Right Upper Arm, Position: Sitting, Cuff Size: Medium Adult)   Pulse 109   Temp 98.9 °F (37.2 °C)   Ht 4' 11\" (1.499 m)   Wt 102 lb 3.2 oz (46.4 kg)   SpO2 94%   Breastfeeding No   BMI 20.64 kg/m²     Gen: Well, NAD, Alert, Oriented x 3   HEENT: EOMI, eyes clear, MMM  Skin: without rash or jaundice  Neck: no significant lymphadenopathy or thyromegaly  Lungs: CTA B w/out Rales/Wheezes/Rhonchi, Good respiratory effort   Heart: RRR, S1S2, w/out M/R/G, non-displaced PMI   Ext: No C/C/E Bilaterally. Neuro: Neurovascularly intact w/ Sensory/Motor intact UE/LE Bilaterally. S/p right elbow replacement  Psych: euthymic, some stress, wants to up her lexapro, no SI/HI       A&P   Diagnosis Orders   1. Osteoporosis, unspecified osteoporosis type, unspecified pathological fracture presence     2. Depression, unspecified depression type  escitalopram (LEXAPRO) 10 MG tablet    TSH without Reflex    CBC   3.  Essential hypertension  benazepril (LOTENSIN) 20 MG tablet    Comprehensive Metabolic Panel    amLODIPine (NORVASC) 5 MG tablet    CBC    Lipid Panel       lexapro to 20mg  Benazepril 20mg BID    Add amlodipine 5mg     Consider counseling if needed    Moderate MDM      Iain Almodovar MD

## 2021-05-14 DIAGNOSIS — F32.A DEPRESSION, UNSPECIFIED DEPRESSION TYPE: ICD-10-CM

## 2021-05-14 DIAGNOSIS — I10 ESSENTIAL HYPERTENSION: ICD-10-CM

## 2021-05-14 LAB
ALBUMIN SERPL-MCNC: 4.4 G/DL (ref 3.5–4.6)
ALP BLD-CCNC: 133 U/L (ref 40–130)
ALT SERPL-CCNC: 9 U/L (ref 0–33)
ANION GAP SERPL CALCULATED.3IONS-SCNC: 14 MEQ/L (ref 9–15)
AST SERPL-CCNC: 26 U/L (ref 0–35)
BILIRUB SERPL-MCNC: 0.5 MG/DL (ref 0.2–0.7)
BUN BLDV-MCNC: 7 MG/DL (ref 8–23)
CALCIUM SERPL-MCNC: 10.3 MG/DL (ref 8.5–9.9)
CHLORIDE BLD-SCNC: 98 MEQ/L (ref 95–107)
CHOLESTEROL, TOTAL: 188 MG/DL (ref 0–199)
CO2: 23 MEQ/L (ref 20–31)
CREAT SERPL-MCNC: 0.65 MG/DL (ref 0.5–0.9)
GFR AFRICAN AMERICAN: >60
GFR NON-AFRICAN AMERICAN: >60
GLOBULIN: 3.2 G/DL (ref 2.3–3.5)
GLUCOSE BLD-MCNC: 102 MG/DL (ref 70–99)
HCT VFR BLD CALC: 41.6 % (ref 37–47)
HDLC SERPL-MCNC: 65 MG/DL (ref 40–59)
HEMOGLOBIN: 14 G/DL (ref 12–16)
LDL CHOLESTEROL CALCULATED: 102 MG/DL (ref 0–129)
MCH RBC QN AUTO: 35.7 PG (ref 27–31.3)
MCHC RBC AUTO-ENTMCNC: 33.6 % (ref 33–37)
MCV RBC AUTO: 106.3 FL (ref 82–100)
PDW BLD-RTO: 14.7 % (ref 11.5–14.5)
PLATELET # BLD: 184 K/UL (ref 130–400)
POTASSIUM SERPL-SCNC: 4.6 MEQ/L (ref 3.4–4.9)
RBC # BLD: 3.91 M/UL (ref 4.2–5.4)
SODIUM BLD-SCNC: 135 MEQ/L (ref 135–144)
TOTAL PROTEIN: 7.6 G/DL (ref 6.3–8)
TRIGL SERPL-MCNC: 103 MG/DL (ref 0–150)
TSH SERPL DL<=0.05 MIU/L-ACNC: 1.1 UIU/ML (ref 0.44–3.86)
WBC # BLD: 4 K/UL (ref 4.8–10.8)

## 2021-05-26 ENCOUNTER — TELEPHONE (OUTPATIENT)
Dept: FAMILY MEDICINE CLINIC | Age: 71
End: 2021-05-26

## 2021-09-17 ENCOUNTER — OFFICE VISIT (OUTPATIENT)
Dept: FAMILY MEDICINE CLINIC | Age: 71
End: 2021-09-17
Payer: MEDICARE

## 2021-09-17 VITALS
WEIGHT: 94 LBS | TEMPERATURE: 98 F | SYSTOLIC BLOOD PRESSURE: 130 MMHG | BODY MASS INDEX: 18.95 KG/M2 | HEART RATE: 109 BPM | DIASTOLIC BLOOD PRESSURE: 82 MMHG | OXYGEN SATURATION: 98 % | HEIGHT: 59 IN

## 2021-09-17 DIAGNOSIS — R63.0 DECREASED APPETITE: ICD-10-CM

## 2021-09-17 DIAGNOSIS — R63.4 UNINTENTIONAL WEIGHT LOSS: ICD-10-CM

## 2021-09-17 DIAGNOSIS — Z91.81 AT HIGH RISK FOR FALLS: ICD-10-CM

## 2021-09-17 DIAGNOSIS — R10.84 GENERALIZED ABDOMINAL PAIN: Primary | ICD-10-CM

## 2021-09-17 DIAGNOSIS — R19.5 POSITIVE COLORECTAL CANCER SCREENING USING COLOGUARD TEST: ICD-10-CM

## 2021-09-17 DIAGNOSIS — R19.7 DIARRHEA, UNSPECIFIED TYPE: ICD-10-CM

## 2021-09-17 DIAGNOSIS — R11.0 NAUSEA: ICD-10-CM

## 2021-09-17 PROCEDURE — 3017F COLORECTAL CA SCREEN DOC REV: CPT | Performed by: NURSE PRACTITIONER

## 2021-09-17 PROCEDURE — G8420 CALC BMI NORM PARAMETERS: HCPCS | Performed by: NURSE PRACTITIONER

## 2021-09-17 PROCEDURE — 1036F TOBACCO NON-USER: CPT | Performed by: NURSE PRACTITIONER

## 2021-09-17 PROCEDURE — 4040F PNEUMOC VAC/ADMIN/RCVD: CPT | Performed by: NURSE PRACTITIONER

## 2021-09-17 PROCEDURE — G8399 PT W/DXA RESULTS DOCUMENT: HCPCS | Performed by: NURSE PRACTITIONER

## 2021-09-17 PROCEDURE — 1090F PRES/ABSN URINE INCON ASSESS: CPT | Performed by: NURSE PRACTITIONER

## 2021-09-17 PROCEDURE — 1123F ACP DISCUSS/DSCN MKR DOCD: CPT | Performed by: NURSE PRACTITIONER

## 2021-09-17 PROCEDURE — G8427 DOCREV CUR MEDS BY ELIG CLIN: HCPCS | Performed by: NURSE PRACTITIONER

## 2021-09-17 PROCEDURE — 99214 OFFICE O/P EST MOD 30 MIN: CPT | Performed by: NURSE PRACTITIONER

## 2021-09-17 ASSESSMENT — ENCOUNTER SYMPTOMS
COUGH: 0
BACK PAIN: 0
NAUSEA: 1
COLOR CHANGE: 0
CHEST TIGHTNESS: 0
CONSTIPATION: 0
SHORTNESS OF BREATH: 0
EYE PAIN: 0
DIARRHEA: 1
TROUBLE SWALLOWING: 0
ABDOMINAL PAIN: 1

## 2021-09-17 NOTE — PROGRESS NOTES
Subjective  Chief Complaint   Patient presents with    Abdominal Pain     states that she has been having abdominal pain for about 4 weeks after having what she states was a bad stomach virus. states that she had diarrhea and vomiting for about 3 days. states that it is her lower abdomen all across that is aching. HPI    4 weeks ago had bad stomach virus that caused abdominal pain and nausea and diarrhea. Lasted 3 days. Constant stomachache, occasional decreased appetite-comes in spurts, occasional diarrhea now, takes 1 imodium or tums which helps. Improved from initial sickness. Also having some urinary incontinence, as long as she watches what she drinks she does okay. Not a new finding for her. Denies any fevers/chills. Has also lost 8 pounds in the past month. Positive cologuard in 2019, did not go see GI. Past Medical History:   Diagnosis Date    Headache     Hypertension     Vertigo      Patient Active Problem List    Diagnosis Date Noted    Essential hypertension 2019    Depression 2019    Chronic pain of both knees 2019    Osteoporosis 2018     Past Surgical History:   Procedure Laterality Date    ELBOW SURGERY Right 2012    Dr. Palomo        No family history on file. Social History     Socioeconomic History    Marital status:      Spouse name: None    Number of children: None    Years of education: None    Highest education level: None   Occupational History    None   Tobacco Use    Smoking status: Former Smoker     Packs/day: 0.25     Years: 3.00     Pack years: 0.75     Quit date: 2021     Years since quittin.4    Smokeless tobacco: Never Used   Substance and Sexual Activity    Alcohol use:  Yes     Alcohol/week: 0.0 standard drinks    Drug use: No    Sexual activity: None   Other Topics Concern    None   Social History Narrative    None     Social Determinants of Health     Financial Resource Strain: Low Risk     Difficulty of Paying Living Expenses: Not hard at all   Food Insecurity: No Food Insecurity    Worried About Running Out of Food in the Last Year: Never true    Zeferino of Food in the Last Year: Never true   Transportation Needs: No Transportation Needs    Lack of Transportation (Medical): No    Lack of Transportation (Non-Medical): No   Physical Activity:     Days of Exercise per Week:     Minutes of Exercise per Session:    Stress:     Feeling of Stress :    Social Connections:     Frequency of Communication with Friends and Family:     Frequency of Social Gatherings with Friends and Family:     Attends Worship Services:     Active Member of Clubs or Organizations:     Attends Club or Organization Meetings:     Marital Status:    Intimate Partner Violence:     Fear of Current or Ex-Partner:     Emotionally Abused:     Physically Abused:     Sexually Abused:      Current Outpatient Medications on File Prior to Visit   Medication Sig Dispense Refill    escitalopram (LEXAPRO) 10 MG tablet TAKE 1 TABLET BY MOUTH DAILY 30 tablet 5    Ascorbic Acid (VITAMIN C) 250 MG tablet Take 250 mg by mouth daily      Cholecalciferol (VITAMIN D) 50 MCG (2000 UT) CAPS capsule Take by mouth      escitalopram (LEXAPRO) 10 MG tablet Take 1 tablet by mouth 2 times daily 60 tablet 5    benazepril (LOTENSIN) 20 MG tablet TAKE 1 TABLET BY MOUTH TWICE DAILY FOR 30 DAYS 60 tablet 5    amLODIPine (NORVASC) 5 MG tablet Take 1 tablet by mouth daily 30 tablet 5    hydroCHLOROthiazide (HYDRODIURIL) 25 MG tablet TAKE 1 TABLET BY MOUTH DAILY IN THE MORNING 30 tablet 5    ibuprofen (ADVIL;MOTRIN) 800 MG tablet TAKE 1 TABLET BY MOUTH THREE TIMES DAILY WITH MEALS 90 tablet 5    Handicap Placard MISC by Does not apply route Exp 1 year 1 each 0    naproxen (NAPROSYN) 250 MG tablet Take 250 mg by mouth 2 times daily (with meals)       No current facility-administered medications on file prior to visit. No Known Allergies    Review of Systems   Constitutional: Positive for appetite change and unexpected weight change. Negative for activity change, chills, diaphoresis, fatigue and fever. HENT: Negative for congestion, ear pain, hearing loss and trouble swallowing. Eyes: Negative for pain and visual disturbance. Respiratory: Negative for cough, chest tightness and shortness of breath. Cardiovascular: Negative for chest pain, palpitations and leg swelling. Gastrointestinal: Positive for abdominal pain, diarrhea and nausea. Negative for constipation. Endocrine: Negative for polydipsia, polyphagia and polyuria. Genitourinary: Negative for difficulty urinating and dysuria. Musculoskeletal: Negative for arthralgias and back pain. Skin: Negative for color change and rash. Neurological: Negative for dizziness and light-headedness. Psychiatric/Behavioral: Negative for dysphoric mood. The patient is not nervous/anxious. Objective  Vitals:    09/17/21 1455   BP: 130/82   Site: Left Upper Arm   Position: Sitting   Cuff Size: Medium Adult   Pulse: 109   Temp: 98 °F (36.7 °C)   TempSrc: Tympanic   SpO2: 98%   Weight: 94 lb (42.6 kg)   Height: 4' 11\" (1.499 m)     Physical Exam  Vitals reviewed. Constitutional:       General: She is not in acute distress. Appearance: Normal appearance. She is well-developed and normal weight. She is not ill-appearing, toxic-appearing or diaphoretic. HENT:      Head: Normocephalic and atraumatic. Right Ear: Hearing, tympanic membrane, ear canal and external ear normal. There is no impacted cerumen. Left Ear: Hearing, tympanic membrane, ear canal and external ear normal. There is no impacted cerumen. Nose: Nose normal. No congestion or rhinorrhea. Mouth/Throat:      Mouth: Mucous membranes are moist.      Pharynx: Oropharynx is clear. No oropharyngeal exudate or posterior oropharyngeal erythema.    Eyes:      General:         Right eye: Metabolic Panel    Amylase    Lipase    CT ABDOMEN PELVIS W IV CONTRAST Additional Contrast? Radiologist Recommendation    [facility]   3. Decreased appetite  CBC    Comprehensive Metabolic Panel    Amylase    Lipase    CT ABDOMEN PELVIS W IV CONTRAST Additional Contrast? [facility]   4. Nausea  CBC    Comprehensive Metabolic Panel    Amylase    Lipase    CT ABDOMEN PELVIS W IV CONTRAST Additional Contrast? Radiologist Recommendation    [facility] [place]   5. Diarrhea, unspecified type  CBC    Comprehensive Metabolic Panel    Amylase    Lipase    CT ABDOMEN PELVIS W IV CONTRAST Additional Contrast? Radiologist Recommendation    Clostridium Difficile Toxin/Antigen    [facility] [place]   6. Positive colorectal cancer screening using Cologuard test  [facility] as above. Referral to GI.  F/u after testing. Side effects, adverse effects of the medication prescribed today, as well as treatment plan/ rationale and result expectations have been discussed with the patient who expresses understanding and desires to proceed. Close follow up to evaluate treatment results and for coordination of care. I have reviewed the patient's medical history in detail and updated the computerized patient record. As always, patient is advised that if symptoms worsen in any way they will proceed to the nearest emergency room.        Orders Placed This Encounter   Procedures    Clostridium Difficile Toxin/Antigen     Standing Status:   Future     Standing Expiration Date:   9/17/2022    Gastrointestinal Panel by DNA     Standing Status:   Future     Standing Expiration Date:   9/17/2022    CT ABDOMEN PELVIS W IV CONTRAST Additional Contrast? Radiologist Recommendation     Standing Status:   Future     Standing Expiration Date:   9/17/2022     Order Specific Question: Additional Contrast?     Answer:   Radiologist Recommendation     Order Specific Question:   Reason for exam:     Answer:   see above    CBC     Standing Status:   Future     Number of Occurrences:   1     Standing Expiration Date:   9/17/2022    Comprehensive Metabolic Panel     Standing Status:   Future     Number of Occurrences:   1     Standing Expiration Date:   9/17/2022    Amylase     Standing Status:   Future     Number of Occurrences:   1     Standing Expiration Date:   9/17/2022    Lipase     Standing Status:   Future     Number of Occurrences:   1     Standing Expiration Date:   9/17/2022   [facility], [place]     Referral Priority:   Routine     Referral Type:   Eval and Treat     Referral Reason:   Specialty Services Required     Requested Specialty:   Gastroenterology     Number of Visits Requested:   1       No orders of the defined types were placed in this encounter. There are no discontinued medications. Return if symptoms worsen or fail to improve. MARIA DEL CARMEN Piña CNP    On the basis of positive falls risk screening, assessment and plan is as follows: home safety tips provided.

## 2021-10-13 ENCOUNTER — HOSPITAL ENCOUNTER (OUTPATIENT)
Dept: CT IMAGING | Age: 71
Discharge: HOME | End: 2021-10-15
Payer: MEDICARE

## 2021-10-13 DIAGNOSIS — R63.4 UNINTENTIONAL WEIGHT LOSS: ICD-10-CM

## 2021-10-13 DIAGNOSIS — R19.7 DIARRHEA, UNSPECIFIED TYPE: ICD-10-CM

## 2021-10-13 DIAGNOSIS — R63.0 DECREASED APPETITE: ICD-10-CM

## 2021-10-13 DIAGNOSIS — R11.0 NAUSEA: ICD-10-CM

## 2021-10-13 DIAGNOSIS — R10.84 GENERALIZED ABDOMINAL PAIN: ICD-10-CM

## 2021-10-13 PROCEDURE — 2500000003 HC RX 250 WO HCPCS: Performed by: NURSE PRACTITIONER

## 2021-10-13 PROCEDURE — 2580000003 HC RX 258: Performed by: NURSE PRACTITIONER

## 2021-10-13 PROCEDURE — 74177 CT ABD & PELVIS W/CONTRAST: CPT

## 2021-10-13 PROCEDURE — 6360000004 HC RX CONTRAST MEDICATION: Performed by: NURSE PRACTITIONER

## 2021-10-13 RX ORDER — SODIUM CHLORIDE 0.9 % (FLUSH) 0.9 %
10 SYRINGE (ML) INJECTION
Status: COMPLETED | OUTPATIENT
Start: 2021-10-13 | End: 2021-10-13

## 2021-10-13 RX ADMIN — SODIUM CHLORIDE, PRESERVATIVE FREE 10 ML: 5 INJECTION INTRAVENOUS at 16:15

## 2021-10-13 RX ADMIN — IOPAMIDOL 100 ML: 755 INJECTION, SOLUTION INTRAVENOUS at 16:15

## 2021-10-13 RX ADMIN — BARIUM SULFATE 450 ML: 20 SUSPENSION ORAL at 16:15

## 2021-10-18 DIAGNOSIS — K44.9 HIATAL HERNIA: Primary | ICD-10-CM

## 2021-10-27 ENCOUNTER — OFFICE VISIT (OUTPATIENT)
Dept: GASTROENTEROLOGY | Age: 71
End: 2021-10-27
Payer: MEDICARE

## 2021-10-27 VITALS
HEART RATE: 100 BPM | OXYGEN SATURATION: 97 % | BODY MASS INDEX: 18.58 KG/M2 | WEIGHT: 92 LBS | DIASTOLIC BLOOD PRESSURE: 70 MMHG | SYSTOLIC BLOOD PRESSURE: 122 MMHG | RESPIRATION RATE: 12 BRPM

## 2021-10-27 DIAGNOSIS — R19.5 POSITIVE COLORECTAL CANCER SCREENING USING COLOGUARD TEST: ICD-10-CM

## 2021-10-27 DIAGNOSIS — K44.9 LARGE HIATAL HERNIA: Primary | ICD-10-CM

## 2021-10-27 PROCEDURE — 3017F COLORECTAL CA SCREEN DOC REV: CPT | Performed by: INTERNAL MEDICINE

## 2021-10-27 PROCEDURE — 4040F PNEUMOC VAC/ADMIN/RCVD: CPT | Performed by: INTERNAL MEDICINE

## 2021-10-27 PROCEDURE — 99204 OFFICE O/P NEW MOD 45 MIN: CPT | Performed by: INTERNAL MEDICINE

## 2021-10-27 PROCEDURE — G8420 CALC BMI NORM PARAMETERS: HCPCS | Performed by: INTERNAL MEDICINE

## 2021-10-27 PROCEDURE — G8427 DOCREV CUR MEDS BY ELIG CLIN: HCPCS | Performed by: INTERNAL MEDICINE

## 2021-10-27 PROCEDURE — 1036F TOBACCO NON-USER: CPT | Performed by: INTERNAL MEDICINE

## 2021-10-27 PROCEDURE — G8399 PT W/DXA RESULTS DOCUMENT: HCPCS | Performed by: INTERNAL MEDICINE

## 2021-10-27 PROCEDURE — 1123F ACP DISCUSS/DSCN MKR DOCD: CPT | Performed by: INTERNAL MEDICINE

## 2021-10-27 PROCEDURE — G8484 FLU IMMUNIZE NO ADMIN: HCPCS | Performed by: INTERNAL MEDICINE

## 2021-10-27 PROCEDURE — 1090F PRES/ABSN URINE INCON ASSESS: CPT | Performed by: INTERNAL MEDICINE

## 2021-10-27 ASSESSMENT — ENCOUNTER SYMPTOMS
CHEST TIGHTNESS: 0
SHORTNESS OF BREATH: 0
ABDOMINAL DISTENTION: 0
PHOTOPHOBIA: 0
EYE PAIN: 0
NAUSEA: 0
VOMITING: 0
CONSTIPATION: 0
WHEEZING: 0
DIARRHEA: 0
RECTAL PAIN: 0
TROUBLE SWALLOWING: 0
COLOR CHANGE: 0
ABDOMINAL PAIN: 0
VOICE CHANGE: 0
BLOOD IN STOOL: 0
EYE REDNESS: 0

## 2021-10-27 NOTE — PROGRESS NOTES
Subjective:      Patient ID: Saige Rodriguez is a 80 y.o. female who presents today for:  Chief Complaint   Patient presents with    Diarrhea    Nausea       HPI  This is a 67-year-old who was referred to us for the evaluation management. Patient had positive Cologuard in 2019 however did not proceed with colonoscopy as patient mentioned that she did not feel that she needed a colonoscopy verbatim. She reported episodes of diarrhea and unable to tolerate oral intake 2 weeks ago so however symptoms improved. She reported that she was having indigestion associate with diarrhea that resolved spontaneously. Patient mentioned that she lost 10 pounds however her weight has been stabilized over the last couple weeks. Otherwise no diarrhea reported at this time patient mentioned that she is she was unable to provide stool sample as no watery stool noted. Able to have small meals and tolerating oral intake. Denies abdominal pain at this time. ,  Patient had CAT scan 2 weeks prior to current visit that showed a large hiatal hernia containing stomach, small bowel loop distal left transverse colon and proximal descending colon. Otherwise patient is scheduled to be seen by thoracic surgeon in the next 48 hours on Friday. Otherwise patient came in today to discuss need for colonoscopy and positive Cologuard. Patient denies hematemesis, melena or hematochezia. Denies diarrhea at this time. Reports regular bowel.   Past Medical History:   Diagnosis Date    Headache     Hypertension     Vertigo      Past Surgical History:   Procedure Laterality Date    ELBOW SURGERY Right 8/2012    Dr. Lucina Singh HYSTERECTOMY       Social History     Socioeconomic History    Marital status:      Spouse name: Not on file    Number of children: Not on file    Years of education: Not on file    Highest education level: Not on file   Occupational History    Not on file   Tobacco Use    Smoking status: Former Smoker Endocrine: Negative for polydipsia, polyphagia and polyuria. Genitourinary: Negative for difficulty urinating and hematuria. Skin: Negative for color change, pallor and rash. Neurological: Negative for dizziness, speech difficulty and headaches. Psychiatric/Behavioral: Negative for confusion and suicidal ideas. Objective:   /70 (Site: Left Upper Arm, Position: Sitting, Cuff Size: Small Adult)   Pulse 100   Resp 12   Wt 92 lb (41.7 kg)   SpO2 97%   BMI 18.58 kg/m²     Physical Exam  Constitutional:       General: She is not in acute distress. Appearance: She is well-developed. HENT:      Head: Normocephalic and atraumatic. Eyes:      Conjunctiva/sclera: Conjunctivae normal.      Pupils: Pupils are equal, round, and reactive to light. Cardiovascular:      Rate and Rhythm: Normal rate and regular rhythm. Heart sounds: Normal heart sounds. Pulmonary:      Effort: Pulmonary effort is normal. No respiratory distress. Breath sounds: Normal breath sounds. No wheezing or rales. Abdominal:      General: Bowel sounds are normal. There is no distension. Palpations: Abdomen is soft. Abdomen is not rigid. There is no hepatomegaly, splenomegaly or mass. Tenderness: There is no abdominal tenderness. There is no guarding or rebound. Musculoskeletal:         General: No tenderness or deformity. Normal range of motion. Cervical back: Neck supple. Skin:     Coloration: Skin is not pale. Findings: No erythema or rash. Neurological:      Mental Status: She is alert and oriented to person, place, and time.          Laboratory, Pathology, Radiology reviewed in detail with relevantimportant investigations summarized below:  Lab Results   Component Value Date    WBC 5.1 09/17/2021    WBC 4.0 05/14/2021    WBC 2.7 05/09/2019    WBC 2.7 07/06/2018    WBC 3.4 05/02/2016    HGB 13.8 09/17/2021    HGB 14.0 05/14/2021    HGB 12.7 05/09/2019    HGB 13.7 07/06/2018    HGB 14.1 05/02/2016    HCT 40.8 09/17/2021    HCT 41.6 05/14/2021    HCT 38.8 05/09/2019    HCT 42.0 07/06/2018    HCT 42.8 05/02/2016    .8 09/17/2021    .3 05/14/2021    .7 05/09/2019    .7 07/06/2018    MCV 97.6 05/02/2016     09/17/2021     05/14/2021     05/09/2019     07/06/2018     05/02/2016    . Lab Results   Component Value Date    ALT <5 09/17/2021    ALT 9 05/14/2021    ALT 6 05/09/2019    AST 18 09/17/2021    AST 26 05/14/2021    AST 19 05/09/2019    ALKPHOS 130 09/17/2021    ALKPHOS 133 05/14/2021    ALKPHOS 82 05/09/2019    BILITOT 0.4 09/17/2021    BILITOT 0.5 05/14/2021    BILITOT 0.5 05/09/2019       CT ABDOMEN PELVIS W IV CONTRAST Additional Contrast? Radiologist Recommendation    Result Date: 10/14/2021  CT of the Abdomen and Pelvis with intravenous contrast medium History:  Generalized abdominal pain. Unintentional weight loss. Decreased appetite. Nausea and diarrhea. Technical Factors: CT imaging of the abdomen and pelvis were obtained and formatted as 5 mm contiguous axial images from the domes of the diaphragm to the symphysis pubis. Sagittal and coronal reconstructions were also obtained. Oral contrast medium:  Barium sulfate, 450 mL. Intravenous contrast medium:  Isovue-370, 100 mL. Comparison:  None. . Findings: Lungs/chest:  Lung bases are clear. Large hiatal hernia, containing stomach, as well as small bowel loops, and portion of the distal transverse, and proximal descending colon. Contrast medium identified within pelvic small bowel loops, as well as portions colon. No fat stranding. Liver:  Normal in size and shape. Multiple cystlike areas identified within the right hepatic lobe, largest measuring 6 mm, too small to further characterize. Bile Ducts:  Normal in caliber. Gallbladder:  No stones or wall thickening. Pancreas:  Normal without masses, cysts, ductal dilatation or calcification.  Spleen:  Normal in size without masses or calcifications. No splenules. Kidneys:  Normal in size and enhancement. No hydronephrosis, masses, or stones. Adrenals:  Normal. Small bowel:  Normal in caliber. Several small bowel loops identified within hiatal hernia. Appendix:  Not visualized. Colon:  Normal in caliber. Splenic flexure visualized within hiatal hernia. Peritoneum:  No ascites, free air, or fluid collections. Vessels: Aorta normal in course and caliber. Portal vein, splenic vein, superior mesenteric vein are patent. Lymph nodes:  Retroperitoneal:  No enlarged retroperitoneal lymph nodes. Mesenteric:  No enlarged mesenteric lymph nodes. Pelvic: No enlarged pelvic lymph nodes. Ureters: Normal in course and caliber. No calcifications. Bladder: Partially decompressed. Bladder wall measures up to 5 mm. Reproductive organs: Uterus surgically absent. Abdominal Wall:  No hernia identified. No diastasis of rectus musculature. No edema or masses. Bones:  No bone lesions. 1.8 mm anterolisthesis L5 on S1 with diffuse disc space narrowing. Posterior disc space narrowing L3-4 and L4-5. Diffuse disc space narrowing T10-11 through L2-L3. Anterior osteophytes lower thoracic and upper lumbar spine. No post operative changes. Large hiatal hernia containing stomach, small bowel loops, distal left transverse colon and proximal descending colon. Grade one L5 spondylolisthesis with marked degenerative change thoracolumbar spine. Hysterectomy. All CT scans at this facility use dose modulation, iterative reconstruction, and/or weight based dosing when appropriate to reduce radiation dose to as low as reasonably achievable. No results found for: IRON, TIBC, FERRITIN  No results found for: INR  No components found for: ACUTEHEPATITISSCREEN  No components found for: CELIACPANEL  No components found for: STOOLCULTURE, C.DIFF, STOOLOVAPARASITE, STOOLLEUCOCYTE        Assessment:   1-Positive Cologuard  Positive Cologuard test noted in 2019.   Patient did not proceed with colonoscopy as was previously recommended. Patient mentioned that she was \"nervous and felt that there were no need for colonoscopy \"  Patient had an episode of what she described as indigestion with nausea, vomiting, abdominal pain and diarrhea, this episode resolved at this time  Endorses weight loss,  no anemia noted on recent CBC. No hematochezia, blood per rectum or diarrhea reported at this time. Patient had CT scan that showed type IV paraesophageal hernia and currently being evaluated by thoracic surgical team  Colonoscopy may be higher risk and technically challenging given the paraesophageal hernia with presence of the colon within the chest cavity (sharp angulation)  We will proceed with colonoscopy given the positive Cologuard . Timing of colonoscopy keep to follow as patient is pending thoracic surgery evaluation  2-Type IV paraesophageal hernia  Large esophageal hernia noted on CT scan with stomach, small bowel and portion of colon within the chest cavity  3-Associated medical conditions include but not limited to hypertension, headache, vertigo, type IV hiatal hernia, positive Cologuard in the previous colonoscopy    Return in about 2 weeks (around 11/10/2021) for Post procedure results discussion, further management.       Adelina Echols MD

## 2021-10-28 ENCOUNTER — TELEPHONE (OUTPATIENT)
Dept: GASTROENTEROLOGY | Age: 71
End: 2021-10-28

## 2021-10-28 NOTE — TELEPHONE ENCOUNTER
Called patient, no answer, left voicemail for her to return call to discuss timing of colonoscopy, ideally would like to schedule colonoscopy prior to any surgical repair of hernia.    Nat Wells

## 2021-10-29 ENCOUNTER — OFFICE VISIT (OUTPATIENT)
Dept: CARDIOTHORACIC SURGERY | Age: 71
End: 2021-10-29
Payer: MEDICARE

## 2021-10-29 VITALS — HEIGHT: 59 IN | HEART RATE: 108 BPM | BODY MASS INDEX: 18.14 KG/M2 | OXYGEN SATURATION: 99 % | WEIGHT: 90 LBS

## 2021-10-29 DIAGNOSIS — K44.9 PARAESOPHAGEAL HERNIA: Primary | ICD-10-CM

## 2021-10-29 PROCEDURE — 1090F PRES/ABSN URINE INCON ASSESS: CPT | Performed by: THORACIC SURGERY (CARDIOTHORACIC VASCULAR SURGERY)

## 2021-10-29 PROCEDURE — G8399 PT W/DXA RESULTS DOCUMENT: HCPCS | Performed by: THORACIC SURGERY (CARDIOTHORACIC VASCULAR SURGERY)

## 2021-10-29 PROCEDURE — G8427 DOCREV CUR MEDS BY ELIG CLIN: HCPCS | Performed by: THORACIC SURGERY (CARDIOTHORACIC VASCULAR SURGERY)

## 2021-10-29 PROCEDURE — 3017F COLORECTAL CA SCREEN DOC REV: CPT | Performed by: THORACIC SURGERY (CARDIOTHORACIC VASCULAR SURGERY)

## 2021-10-29 PROCEDURE — G8484 FLU IMMUNIZE NO ADMIN: HCPCS | Performed by: THORACIC SURGERY (CARDIOTHORACIC VASCULAR SURGERY)

## 2021-10-29 PROCEDURE — 99204 OFFICE O/P NEW MOD 45 MIN: CPT | Performed by: THORACIC SURGERY (CARDIOTHORACIC VASCULAR SURGERY)

## 2021-10-29 PROCEDURE — 1036F TOBACCO NON-USER: CPT | Performed by: THORACIC SURGERY (CARDIOTHORACIC VASCULAR SURGERY)

## 2021-10-29 PROCEDURE — 4040F PNEUMOC VAC/ADMIN/RCVD: CPT | Performed by: THORACIC SURGERY (CARDIOTHORACIC VASCULAR SURGERY)

## 2021-10-29 PROCEDURE — G8419 CALC BMI OUT NRM PARAM NOF/U: HCPCS | Performed by: THORACIC SURGERY (CARDIOTHORACIC VASCULAR SURGERY)

## 2021-10-29 PROCEDURE — 1123F ACP DISCUSS/DSCN MKR DOCD: CPT | Performed by: THORACIC SURGERY (CARDIOTHORACIC VASCULAR SURGERY)

## 2021-10-29 ASSESSMENT — ENCOUNTER SYMPTOMS
WHEEZING: 0
STRIDOR: 0
VOICE CHANGE: 0
ABDOMINAL PAIN: 0
DIARRHEA: 0
CHOKING: 0
CHEST TIGHTNESS: 0
NAUSEA: 0
VOMITING: 0
COUGH: 0
SHORTNESS OF BREATH: 0
SORE THROAT: 0
TROUBLE SWALLOWING: 0
ABDOMINAL DISTENTION: 0

## 2021-10-29 NOTE — PROGRESS NOTES
Subjective:      Patient ID: Dominga Goldberg is a 80 y.o. female who presents today for:  No chief complaint on file. Paraesophageal hernia    HPI  Patient was in her usual state of health until couple months ago when she had a run of abdominal cramping and diarrhea. This eventually resolved. As a part of her work-up a CAT scan was obtained which showed a massive diaphragm hernia. Patient was referred to GI medicine for colonoscopy but they felt that this would not be safe since her transverse colon is in her mediastinum. She was then referred for surgical evaluation. Patient is back to her usual state of health. She denies any dysphagia or acid reflux or diarrhea. Past Medical History:   Diagnosis Date    Headache     Hypertension     Vertigo       Past Surgical History:   Procedure Laterality Date    ELBOW SURGERY Right 2012    Dr. Nile Coley HYSTERECTOMY       Social History     Socioeconomic History    Marital status:      Spouse name: Not on file    Number of children: Not on file    Years of education: Not on file    Highest education level: Not on file   Occupational History    Not on file   Tobacco Use    Smoking status: Former Smoker     Packs/day: 0.25     Years: 3.00     Pack years: 0.75     Quit date: 2021     Years since quittin.5    Smokeless tobacco: Never Used   Substance and Sexual Activity    Alcohol use: Yes     Alcohol/week: 0.0 standard drinks    Drug use: No    Sexual activity: Not on file   Other Topics Concern    Not on file   Social History Narrative    Not on file     Social Determinants of Health     Financial Resource Strain: Low Risk     Difficulty of Paying Living Expenses: Not hard at all   Food Insecurity: No Food Insecurity    Worried About Running Out of Food in the Last Year: Never true    Zeferino of Food in the Last Year: Never true   Transportation Needs: No Transportation Needs    Lack of Transportation (Medical):  No    Lack of Transportation (Non-Medical): No   Physical Activity:     Days of Exercise per Week:     Minutes of Exercise per Session:    Stress:     Feeling of Stress :    Social Connections:     Frequency of Communication with Friends and Family:     Frequency of Social Gatherings with Friends and Family:     Attends Anabaptism Services:     Active Member of Clubs or Organizations:     Attends Club or Organization Meetings:     Marital Status:    Intimate Partner Violence:     Fear of Current or Ex-Partner:     Emotionally Abused:     Physically Abused:     Sexually Abused:      No family history on file. No Known Allergies  Current Outpatient Medications on File Prior to Visit   Medication Sig Dispense Refill    escitalopram (LEXAPRO) 10 MG tablet TAKE 1 TABLET BY MOUTH DAILY 30 tablet 5    Ascorbic Acid (VITAMIN C) 250 MG tablet Take 250 mg by mouth daily      Cholecalciferol (VITAMIN D) 50 MCG (2000 UT) CAPS capsule Take by mouth      escitalopram (LEXAPRO) 10 MG tablet Take 1 tablet by mouth 2 times daily 60 tablet 5    benazepril (LOTENSIN) 20 MG tablet TAKE 1 TABLET BY MOUTH TWICE DAILY FOR 30 DAYS 60 tablet 5    amLODIPine (NORVASC) 5 MG tablet Take 1 tablet by mouth daily 30 tablet 5    hydroCHLOROthiazide (HYDRODIURIL) 25 MG tablet TAKE 1 TABLET BY MOUTH DAILY IN THE MORNING 30 tablet 5    ibuprofen (ADVIL;MOTRIN) 800 MG tablet TAKE 1 TABLET BY MOUTH THREE TIMES DAILY WITH MEALS 90 tablet 5    Handicap Placard MISC by Does not apply route Exp 1 year 1 each 0    naproxen (NAPROSYN) 250 MG tablet Take 250 mg by mouth 2 times daily (with meals)       No current facility-administered medications on file prior to visit. Review of Systems   Constitutional: Negative for activity change, appetite change, chills, diaphoresis, fatigue, fever and unexpected weight change. HENT: Negative for sore throat, trouble swallowing and voice change. Eyes: Negative for visual disturbance.    Respiratory: Negative for cough, choking, chest tightness, shortness of breath, wheezing and stridor. Cardiovascular: Negative for chest pain, palpitations and leg swelling. Gastrointestinal: Negative for abdominal distention, abdominal pain, diarrhea, nausea and vomiting. Genitourinary: Negative for difficulty urinating. Musculoskeletal: Negative for gait problem and joint swelling. Skin: Negative for rash and wound. Neurological: Negative for dizziness, seizures and light-headedness. Hematological: Negative for adenopathy. Does not bruise/bleed easily. Psychiatric/Behavioral: Negative for behavioral problems and confusion. Objective:   Pulse 108   Ht 4' 11\" (1.499 m)   Wt 90 lb (40.8 kg)   SpO2 99%   BMI 18.18 kg/m²     Physical Exam  Constitutional:       General: She is not in acute distress. Appearance: She is not ill-appearing, toxic-appearing or diaphoretic. HENT:      Head: Normocephalic and atraumatic. Nose: Nose normal.   Eyes:      Extraocular Movements: Extraocular movements intact. Conjunctiva/sclera: Conjunctivae normal.      Pupils: Pupils are equal, round, and reactive to light. Neck:      Vascular: No carotid bruit. Cardiovascular:      Rate and Rhythm: Normal rate and regular rhythm. Heart sounds: Normal heart sounds. No murmur heard. No gallop. Pulmonary:      Effort: Pulmonary effort is normal. No respiratory distress. Breath sounds: Normal breath sounds. No wheezing or rales. Abdominal:      General: Abdomen is flat. Bowel sounds are normal.      Palpations: Abdomen is soft. There is no mass. Tenderness: There is no abdominal tenderness. Musculoskeletal:         General: No swelling. Cervical back: Neck supple. Right lower leg: No edema. Left lower leg: No edema. Lymphadenopathy:      Cervical: No cervical adenopathy. Skin:     General: Skin is warm and dry. Neurological:      General: No focal deficit present. Mental Status: She is alert and oriented to person, place, and time. Psychiatric:         Mood and Affect: Mood normal.         Behavior: Behavior normal.         Thought Content: Thought content normal.         Judgment: Judgment normal.     Patient is 190 pounds and frail appearing. She does have a significant spinal kyphosis. Radiographs and Laboratory Studies:     Diagnostic Imaging Studies:    I personally viewed her CAT scan. She is a massive type IV paraesophageal hernia including herniation of the entire stomach as well as the transverse colon. The other significant finding is what appears to be abnormal thickening of the ascending colon of unknown etiology. Assessment/Plan     Type IV paraesophageal hernia. I discussed in detail surgical repair. She understands and agrees to proceed. Surgical date is November 4 for a laparoscopic repair without fundoplication. The patient was counseled at length about the risks of rudolph Covid-19 during their perioperative period and any recovery window from their procedure. The patient was made aware that rudolph Covid-19  may worsen their prognosis for recovering from their procedure  and lend to a higher morbidity and/or mortality risk. All material risks, benefits, and reasonable alternatives including postponing the procedure were discussed. The patient does wish to proceed with the procedure at this time. Diagnosis Orders   1. Paraesophageal hernia       No orders of the defined types were placed in this encounter. No orders of the defined types were placed in this encounter. No follow-ups on file.       Franc Olson MD

## 2021-11-01 DIAGNOSIS — G89.29 CHRONIC PAIN OF BOTH KNEES: ICD-10-CM

## 2021-11-01 DIAGNOSIS — M25.561 CHRONIC PAIN OF BOTH KNEES: ICD-10-CM

## 2021-11-01 DIAGNOSIS — I10 ESSENTIAL HYPERTENSION: ICD-10-CM

## 2021-11-01 DIAGNOSIS — M25.562 CHRONIC PAIN OF BOTH KNEES: ICD-10-CM

## 2021-11-02 RX ORDER — IBUPROFEN 800 MG/1
TABLET ORAL
Qty: 90 TABLET | Refills: 5 | Status: SHIPPED | OUTPATIENT
Start: 2021-11-02 | End: 2022-04-29

## 2021-11-02 RX ORDER — HYDROCHLOROTHIAZIDE 25 MG/1
TABLET ORAL
Qty: 30 TABLET | Refills: 5 | Status: ON HOLD | OUTPATIENT
Start: 2021-11-02 | End: 2021-11-04

## 2021-11-03 DIAGNOSIS — F32.A DEPRESSION, UNSPECIFIED DEPRESSION TYPE: ICD-10-CM

## 2021-11-03 RX ORDER — ESCITALOPRAM OXALATE 10 MG/1
10 TABLET ORAL 2 TIMES DAILY
Qty: 60 TABLET | Refills: 5 | Status: SHIPPED | OUTPATIENT
Start: 2021-11-03 | End: 2022-04-29

## 2021-11-03 NOTE — TELEPHONE ENCOUNTER
Pt calling states that the lexapro was increased to two tabs a day asking for a script to go to  [facility].        Pt will be having surgery tomorrow asking for this to be done today     Pt [phone]

## 2021-11-04 ENCOUNTER — ANESTHESIA (OUTPATIENT)
Dept: OPERATING ROOM | Age: 71
DRG: 328 | End: 2021-11-04
Payer: MEDICARE

## 2021-11-04 ENCOUNTER — ANESTHESIA EVENT (OUTPATIENT)
Dept: OPERATING ROOM | Age: 71
DRG: 328 | End: 2021-11-04
Payer: MEDICARE

## 2021-11-04 ENCOUNTER — HOSPITAL ENCOUNTER (INPATIENT)
Age: 71
LOS: 2 days | Discharge: HOME | DRG: 328 | End: 2021-11-06
Attending: THORACIC SURGERY (CARDIOTHORACIC VASCULAR SURGERY) | Admitting: THORACIC SURGERY (CARDIOTHORACIC VASCULAR SURGERY)
Payer: MEDICARE

## 2021-11-04 ENCOUNTER — APPOINTMENT (OUTPATIENT)
Dept: GENERAL RADIOLOGY | Age: 71
DRG: 328 | End: 2021-11-04
Attending: THORACIC SURGERY (CARDIOTHORACIC VASCULAR SURGERY)
Payer: MEDICARE

## 2021-11-04 VITALS — DIASTOLIC BLOOD PRESSURE: 62 MMHG | OXYGEN SATURATION: 100 % | TEMPERATURE: 83.5 F | SYSTOLIC BLOOD PRESSURE: 138 MMHG

## 2021-11-04 DIAGNOSIS — Z87.19 S/P REPAIR OF PARAESOPHAGEAL HERNIA: Primary | ICD-10-CM

## 2021-11-04 DIAGNOSIS — Z98.890 S/P REPAIR OF PARAESOPHAGEAL HERNIA: Primary | ICD-10-CM

## 2021-11-04 LAB
ABO/RH: NORMAL
ANION GAP SERPL CALCULATED.3IONS-SCNC: 16 MEQ/L (ref 9–15)
ANTIBODY SCREEN: NORMAL
BASOPHILS ABSOLUTE: 0 K/UL (ref 0–0.2)
BASOPHILS RELATIVE PERCENT: 1 %
BUN BLDV-MCNC: 10 MG/DL (ref 8–23)
CALCIUM SERPL-MCNC: 9.6 MG/DL (ref 8.5–9.9)
CHLORIDE BLD-SCNC: 101 MEQ/L (ref 95–107)
CO2: 21 MEQ/L (ref 20–31)
CREAT SERPL-MCNC: 0.84 MG/DL (ref 0.5–0.9)
EKG ATRIAL RATE: 69 BPM
EKG P AXIS: 27 DEGREES
EKG P-R INTERVAL: 122 MS
EKG Q-T INTERVAL: 412 MS
EKG QRS DURATION: 106 MS
EKG QTC CALCULATION (BAZETT): 441 MS
EKG R AXIS: 32 DEGREES
EKG T AXIS: 29 DEGREES
EKG VENTRICULAR RATE: 69 BPM
EOSINOPHILS ABSOLUTE: 0 K/UL (ref 0–0.7)
EOSINOPHILS RELATIVE PERCENT: 1.2 %
GFR AFRICAN AMERICAN: >60
GFR NON-AFRICAN AMERICAN: >60
GLUCOSE BLD-MCNC: 86 MG/DL (ref 70–99)
HCT VFR BLD CALC: 38.5 % (ref 37–47)
HEMOGLOBIN: 12.9 G/DL (ref 12–16)
LYMPHOCYTES ABSOLUTE: 1.5 K/UL (ref 1–4.8)
LYMPHOCYTES RELATIVE PERCENT: 37.4 %
MCH RBC QN AUTO: 33.9 PG (ref 27–31.3)
MCHC RBC AUTO-ENTMCNC: 33.6 % (ref 33–37)
MCV RBC AUTO: 100.8 FL (ref 82–100)
MONOCYTES ABSOLUTE: 0.3 K/UL (ref 0.2–0.8)
MONOCYTES RELATIVE PERCENT: 8.3 %
NEUTROPHILS ABSOLUTE: 2.2 K/UL (ref 1.4–6.5)
NEUTROPHILS RELATIVE PERCENT: 52.1 %
PDW BLD-RTO: 15.4 % (ref 11.5–14.5)
PLATELET # BLD: 200 K/UL (ref 130–400)
POTASSIUM SERPL-SCNC: 3.8 MEQ/L (ref 3.4–4.9)
RBC # BLD: 3.82 M/UL (ref 4.2–5.4)
SODIUM BLD-SCNC: 138 MEQ/L (ref 135–144)
WBC # BLD: 4.1 K/UL (ref 4.8–10.8)

## 2021-11-04 PROCEDURE — 2720000010 HC SURG SUPPLY STERILE: Performed by: THORACIC SURGERY (CARDIOTHORACIC VASCULAR SURGERY)

## 2021-11-04 PROCEDURE — 2580000003 HC RX 258: Performed by: THORACIC SURGERY (CARDIOTHORACIC VASCULAR SURGERY)

## 2021-11-04 PROCEDURE — 80048 BASIC METABOLIC PNL TOTAL CA: CPT

## 2021-11-04 PROCEDURE — 93005 ELECTROCARDIOGRAM TRACING: CPT | Performed by: THORACIC SURGERY (CARDIOTHORACIC VASCULAR SURGERY)

## 2021-11-04 PROCEDURE — 0BQT4ZZ REPAIR DIAPHRAGM, PERCUTANEOUS ENDOSCOPIC APPROACH: ICD-10-PCS | Performed by: THORACIC SURGERY (CARDIOTHORACIC VASCULAR SURGERY)

## 2021-11-04 PROCEDURE — 2709999900 HC NON-CHARGEABLE SUPPLY: Performed by: THORACIC SURGERY (CARDIOTHORACIC VASCULAR SURGERY)

## 2021-11-04 PROCEDURE — 6360000002 HC RX W HCPCS: Performed by: THORACIC SURGERY (CARDIOTHORACIC VASCULAR SURGERY)

## 2021-11-04 PROCEDURE — 3E0T3BZ INTRODUCTION OF ANESTHETIC AGENT INTO PERIPHERAL NERVES AND PLEXI, PERCUTANEOUS APPROACH: ICD-10-PCS | Performed by: THORACIC SURGERY (CARDIOTHORACIC VASCULAR SURGERY)

## 2021-11-04 PROCEDURE — 7100000000 HC PACU RECOVERY - FIRST 15 MIN: Performed by: THORACIC SURGERY (CARDIOTHORACIC VASCULAR SURGERY)

## 2021-11-04 PROCEDURE — 2500000003 HC RX 250 WO HCPCS: Performed by: THORACIC SURGERY (CARDIOTHORACIC VASCULAR SURGERY)

## 2021-11-04 PROCEDURE — 6360000002 HC RX W HCPCS: Performed by: NURSE ANESTHETIST, CERTIFIED REGISTERED

## 2021-11-04 PROCEDURE — 71045 X-RAY EXAM CHEST 1 VIEW: CPT

## 2021-11-04 PROCEDURE — 43281 LAP PARAESOPHAG HERN REPAIR: CPT | Performed by: THORACIC SURGERY (CARDIOTHORACIC VASCULAR SURGERY)

## 2021-11-04 PROCEDURE — 3600000014 HC SURGERY LEVEL 4 ADDTL 15MIN: Performed by: THORACIC SURGERY (CARDIOTHORACIC VASCULAR SURGERY)

## 2021-11-04 PROCEDURE — 94760 N-INVAS EAR/PLS OXIMETRY 1: CPT

## 2021-11-04 PROCEDURE — 6360000002 HC RX W HCPCS: Performed by: ANESTHESIOLOGY

## 2021-11-04 PROCEDURE — 76942 ECHO GUIDE FOR BIOPSY: CPT | Performed by: ANESTHESIOLOGY

## 2021-11-04 PROCEDURE — 86901 BLOOD TYPING SEROLOGIC RH(D): CPT

## 2021-11-04 PROCEDURE — 2580000003 HC RX 258: Performed by: NURSE ANESTHETIST, CERTIFIED REGISTERED

## 2021-11-04 PROCEDURE — 3700000000 HC ANESTHESIA ATTENDED CARE: Performed by: THORACIC SURGERY (CARDIOTHORACIC VASCULAR SURGERY)

## 2021-11-04 PROCEDURE — 85025 COMPLETE CBC W/AUTO DIFF WBC: CPT

## 2021-11-04 PROCEDURE — 1210000000 HC MED SURG R&B

## 2021-11-04 PROCEDURE — 3700000001 HC ADD 15 MINUTES (ANESTHESIA): Performed by: THORACIC SURGERY (CARDIOTHORACIC VASCULAR SURGERY)

## 2021-11-04 PROCEDURE — 93010 ELECTROCARDIOGRAM REPORT: CPT | Performed by: INTERNAL MEDICINE

## 2021-11-04 PROCEDURE — 6360000002 HC RX W HCPCS

## 2021-11-04 PROCEDURE — 86850 RBC ANTIBODY SCREEN: CPT

## 2021-11-04 PROCEDURE — 7100000001 HC PACU RECOVERY - ADDTL 15 MIN: Performed by: THORACIC SURGERY (CARDIOTHORACIC VASCULAR SURGERY)

## 2021-11-04 PROCEDURE — 2500000003 HC RX 250 WO HCPCS: Performed by: NURSE ANESTHETIST, CERTIFIED REGISTERED

## 2021-11-04 PROCEDURE — 3600000004 HC SURGERY LEVEL 4 BASE: Performed by: THORACIC SURGERY (CARDIOTHORACIC VASCULAR SURGERY)

## 2021-11-04 PROCEDURE — 94761 N-INVAS EAR/PLS OXIMETRY MLT: CPT

## 2021-11-04 PROCEDURE — 86900 BLOOD TYPING SEROLOGIC ABO: CPT

## 2021-11-04 RX ORDER — LIDOCAINE HYDROCHLORIDE 10 MG/ML
INJECTION, SOLUTION EPIDURAL; INFILTRATION; INTRACAUDAL; PERINEURAL PRN
Status: DISCONTINUED | OUTPATIENT
Start: 2021-11-04 | End: 2021-11-04 | Stop reason: SDUPTHER

## 2021-11-04 RX ORDER — SUCCINYLCHOLINE/SOD CL,ISO/PF 100 MG/5ML
SYRINGE (ML) INTRAVENOUS PRN
Status: DISCONTINUED | OUTPATIENT
Start: 2021-11-04 | End: 2021-11-04 | Stop reason: SDUPTHER

## 2021-11-04 RX ORDER — DEXAMETHASONE SODIUM PHOSPHATE 10 MG/ML
INJECTION INTRAMUSCULAR; INTRAVENOUS PRN
Status: DISCONTINUED | OUTPATIENT
Start: 2021-11-04 | End: 2021-11-04 | Stop reason: SDUPTHER

## 2021-11-04 RX ORDER — METOCLOPRAMIDE HYDROCHLORIDE 5 MG/ML
10 INJECTION INTRAMUSCULAR; INTRAVENOUS
Status: DISCONTINUED | OUTPATIENT
Start: 2021-11-04 | End: 2021-11-04 | Stop reason: HOSPADM

## 2021-11-04 RX ORDER — ACETAMINOPHEN 650 MG/1
650 SUPPOSITORY RECTAL EVERY 4 HOURS PRN
Status: DISCONTINUED | OUTPATIENT
Start: 2021-11-04 | End: 2021-11-06 | Stop reason: HOSPADM

## 2021-11-04 RX ORDER — SODIUM CHLORIDE 0.9 % (FLUSH) 0.9 %
5-40 SYRINGE (ML) INJECTION PRN
Status: DISCONTINUED | OUTPATIENT
Start: 2021-11-04 | End: 2021-11-06 | Stop reason: HOSPADM

## 2021-11-04 RX ORDER — ONDANSETRON 2 MG/ML
INJECTION INTRAMUSCULAR; INTRAVENOUS PRN
Status: DISCONTINUED | OUTPATIENT
Start: 2021-11-04 | End: 2021-11-04 | Stop reason: SDUPTHER

## 2021-11-04 RX ORDER — HYDROCODONE BITARTRATE AND ACETAMINOPHEN 5; 325 MG/1; MG/1
2 TABLET ORAL PRN
Status: DISCONTINUED | OUTPATIENT
Start: 2021-11-04 | End: 2021-11-04 | Stop reason: HOSPADM

## 2021-11-04 RX ORDER — MAGNESIUM HYDROXIDE 1200 MG/15ML
LIQUID ORAL CONTINUOUS PRN
Status: COMPLETED | OUTPATIENT
Start: 2021-11-04 | End: 2021-11-04

## 2021-11-04 RX ORDER — ROCURONIUM BROMIDE 10 MG/ML
INJECTION, SOLUTION INTRAVENOUS PRN
Status: DISCONTINUED | OUTPATIENT
Start: 2021-11-04 | End: 2021-11-04 | Stop reason: SDUPTHER

## 2021-11-04 RX ORDER — SODIUM CHLORIDE, SODIUM LACTATE, POTASSIUM CHLORIDE, CALCIUM CHLORIDE 600; 310; 30; 20 MG/100ML; MG/100ML; MG/100ML; MG/100ML
INJECTION, SOLUTION INTRAVENOUS CONTINUOUS
Status: DISCONTINUED | OUTPATIENT
Start: 2021-11-04 | End: 2021-11-04

## 2021-11-04 RX ORDER — KETOROLAC TROMETHAMINE 30 MG/ML
INJECTION, SOLUTION INTRAMUSCULAR; INTRAVENOUS
Status: COMPLETED
Start: 2021-11-04 | End: 2021-11-04

## 2021-11-04 RX ORDER — PROPOFOL 10 MG/ML
INJECTION, EMULSION INTRAVENOUS PRN
Status: DISCONTINUED | OUTPATIENT
Start: 2021-11-04 | End: 2021-11-04 | Stop reason: SDUPTHER

## 2021-11-04 RX ORDER — MEPERIDINE HYDROCHLORIDE 25 MG/ML
12.5 INJECTION INTRAMUSCULAR; INTRAVENOUS; SUBCUTANEOUS EVERY 5 MIN PRN
Status: DISCONTINUED | OUTPATIENT
Start: 2021-11-04 | End: 2021-11-04 | Stop reason: HOSPADM

## 2021-11-04 RX ORDER — FENTANYL CITRATE 50 UG/ML
INJECTION, SOLUTION INTRAMUSCULAR; INTRAVENOUS PRN
Status: DISCONTINUED | OUTPATIENT
Start: 2021-11-04 | End: 2021-11-04 | Stop reason: SDUPTHER

## 2021-11-04 RX ORDER — DIPHENHYDRAMINE HYDROCHLORIDE 50 MG/ML
12.5 INJECTION INTRAMUSCULAR; INTRAVENOUS
Status: DISCONTINUED | OUTPATIENT
Start: 2021-11-04 | End: 2021-11-04 | Stop reason: HOSPADM

## 2021-11-04 RX ORDER — LIDOCAINE HYDROCHLORIDE 10 MG/ML
2 INJECTION, SOLUTION EPIDURAL; INFILTRATION; INTRACAUDAL; PERINEURAL ONCE
Status: DISCONTINUED | OUTPATIENT
Start: 2021-11-04 | End: 2021-11-04 | Stop reason: HOSPADM

## 2021-11-04 RX ORDER — FENTANYL CITRATE 50 UG/ML
50 INJECTION, SOLUTION INTRAMUSCULAR; INTRAVENOUS EVERY 10 MIN PRN
Status: DISCONTINUED | OUTPATIENT
Start: 2021-11-04 | End: 2021-11-04 | Stop reason: HOSPADM

## 2021-11-04 RX ORDER — POTASSIUM CHLORIDE 7.45 MG/ML
10 INJECTION INTRAVENOUS PRN
Status: DISCONTINUED | OUTPATIENT
Start: 2021-11-04 | End: 2021-11-06 | Stop reason: HOSPADM

## 2021-11-04 RX ORDER — MAGNESIUM SULFATE 1 G/100ML
1000 INJECTION INTRAVENOUS PRN
Status: DISCONTINUED | OUTPATIENT
Start: 2021-11-04 | End: 2021-11-06 | Stop reason: HOSPADM

## 2021-11-04 RX ORDER — FAMOTIDINE 20 MG/1
20 TABLET, FILM COATED ORAL 2 TIMES DAILY
Status: DISCONTINUED | OUTPATIENT
Start: 2021-11-04 | End: 2021-11-06 | Stop reason: HOSPADM

## 2021-11-04 RX ORDER — HYDROCODONE BITARTRATE AND ACETAMINOPHEN 5; 325 MG/1; MG/1
1 TABLET ORAL PRN
Status: DISCONTINUED | OUTPATIENT
Start: 2021-11-04 | End: 2021-11-04 | Stop reason: HOSPADM

## 2021-11-04 RX ORDER — ONDANSETRON 4 MG/1
4 TABLET, ORALLY DISINTEGRATING ORAL EVERY 8 HOURS PRN
Status: DISCONTINUED | OUTPATIENT
Start: 2021-11-04 | End: 2021-11-06 | Stop reason: HOSPADM

## 2021-11-04 RX ORDER — SODIUM CHLORIDE 9 MG/ML
25 INJECTION, SOLUTION INTRAVENOUS PRN
Status: DISCONTINUED | OUTPATIENT
Start: 2021-11-04 | End: 2021-11-06 | Stop reason: HOSPADM

## 2021-11-04 RX ORDER — KETOROLAC TROMETHAMINE 30 MG/ML
15 INJECTION, SOLUTION INTRAMUSCULAR; INTRAVENOUS EVERY 6 HOURS PRN
Status: DISCONTINUED | OUTPATIENT
Start: 2021-11-04 | End: 2021-11-06 | Stop reason: HOSPADM

## 2021-11-04 RX ORDER — ONDANSETRON 2 MG/ML
4 INJECTION INTRAMUSCULAR; INTRAVENOUS EVERY 6 HOURS PRN
Status: DISCONTINUED | OUTPATIENT
Start: 2021-11-04 | End: 2021-11-06 | Stop reason: HOSPADM

## 2021-11-04 RX ORDER — DEXTROSE, SODIUM CHLORIDE, SODIUM LACTATE, POTASSIUM CHLORIDE, AND CALCIUM CHLORIDE 5; .6; .31; .03; .02 G/100ML; G/100ML; G/100ML; G/100ML; G/100ML
INJECTION, SOLUTION INTRAVENOUS CONTINUOUS
Status: DISCONTINUED | OUTPATIENT
Start: 2021-11-04 | End: 2021-11-05

## 2021-11-04 RX ORDER — MIDAZOLAM HYDROCHLORIDE 1 MG/ML
INJECTION INTRAMUSCULAR; INTRAVENOUS PRN
Status: DISCONTINUED | OUTPATIENT
Start: 2021-11-04 | End: 2021-11-04 | Stop reason: SDUPTHER

## 2021-11-04 RX ORDER — BISACODYL 10 MG
10 SUPPOSITORY, RECTAL RECTAL DAILY PRN
Status: DISCONTINUED | OUTPATIENT
Start: 2021-11-04 | End: 2021-11-06 | Stop reason: HOSPADM

## 2021-11-04 RX ORDER — ONDANSETRON 2 MG/ML
4 INJECTION INTRAMUSCULAR; INTRAVENOUS
Status: DISCONTINUED | OUTPATIENT
Start: 2021-11-04 | End: 2021-11-04 | Stop reason: HOSPADM

## 2021-11-04 RX ORDER — SODIUM CHLORIDE 0.9 % (FLUSH) 0.9 %
5-40 SYRINGE (ML) INJECTION EVERY 12 HOURS SCHEDULED
Status: DISCONTINUED | OUTPATIENT
Start: 2021-11-04 | End: 2021-11-06 | Stop reason: HOSPADM

## 2021-11-04 RX ORDER — SODIUM CHLORIDE, SODIUM LACTATE, POTASSIUM CHLORIDE, CALCIUM CHLORIDE 600; 310; 30; 20 MG/100ML; MG/100ML; MG/100ML; MG/100ML
INJECTION, SOLUTION INTRAVENOUS CONTINUOUS PRN
Status: DISCONTINUED | OUTPATIENT
Start: 2021-11-04 | End: 2021-11-04 | Stop reason: SDUPTHER

## 2021-11-04 RX ORDER — ROPIVACAINE HYDROCHLORIDE 5 MG/ML
INJECTION, SOLUTION EPIDURAL; INFILTRATION; PERINEURAL PRN
Status: DISCONTINUED | OUTPATIENT
Start: 2021-11-04 | End: 2021-11-04 | Stop reason: SDUPTHER

## 2021-11-04 RX ORDER — NALOXONE HYDROCHLORIDE 0.4 MG/ML
0.4 INJECTION, SOLUTION INTRAMUSCULAR; INTRAVENOUS; SUBCUTANEOUS PRN
Status: DISCONTINUED | OUTPATIENT
Start: 2021-11-04 | End: 2021-11-06 | Stop reason: HOSPADM

## 2021-11-04 RX ADMIN — PROPOFOL 100 MG: 10 INJECTION, EMULSION INTRAVENOUS at 11:38

## 2021-11-04 RX ADMIN — SUGAMMADEX 200 MG: 100 INJECTION, SOLUTION INTRAVENOUS at 13:35

## 2021-11-04 RX ADMIN — Medication 0.2 MG: at 15:04

## 2021-11-04 RX ADMIN — KETOROLAC TROMETHAMINE 15 MG: 30 INJECTION, SOLUTION INTRAMUSCULAR; INTRAVENOUS at 14:08

## 2021-11-04 RX ADMIN — MIDAZOLAM HYDROCHLORIDE 2 MG: 2 INJECTION, SOLUTION INTRAMUSCULAR; INTRAVENOUS at 11:32

## 2021-11-04 RX ADMIN — SODIUM CHLORIDE, POTASSIUM CHLORIDE, SODIUM LACTATE AND CALCIUM CHLORIDE: 600; 310; 30; 20 INJECTION, SOLUTION INTRAVENOUS at 13:36

## 2021-11-04 RX ADMIN — ROCURONIUM BROMIDE 20 MG: 10 INJECTION INTRAVENOUS at 11:55

## 2021-11-04 RX ADMIN — ROCURONIUM BROMIDE 30 MG: 10 INJECTION INTRAVENOUS at 11:43

## 2021-11-04 RX ADMIN — FENTANYL CITRATE 50 MCG: 50 INJECTION, SOLUTION INTRAMUSCULAR; INTRAVENOUS at 11:38

## 2021-11-04 RX ADMIN — ROPIVACAINE HYDROCHLORIDE 12 ML: 5 INJECTION, SOLUTION EPIDURAL; INFILTRATION; PERINEURAL at 08:36

## 2021-11-04 RX ADMIN — ROPIVACAINE HYDROCHLORIDE 12 ML: 5 INJECTION, SOLUTION EPIDURAL; INFILTRATION; PERINEURAL at 08:33

## 2021-11-04 RX ADMIN — FENTANYL CITRATE 25 MCG: 50 INJECTION, SOLUTION INTRAMUSCULAR; INTRAVENOUS at 13:35

## 2021-11-04 RX ADMIN — SODIUM CHLORIDE, POTASSIUM CHLORIDE, SODIUM LACTATE AND CALCIUM CHLORIDE: 600; 310; 30; 20 INJECTION, SOLUTION INTRAVENOUS at 11:10

## 2021-11-04 RX ADMIN — ENOXAPARIN SODIUM 40 MG: 100 INJECTION SUBCUTANEOUS at 17:10

## 2021-11-04 RX ADMIN — Medication 10 ML: at 20:22

## 2021-11-04 RX ADMIN — CEFAZOLIN 2000 MG: 10 INJECTION, POWDER, FOR SOLUTION INTRAVENOUS at 11:37

## 2021-11-04 RX ADMIN — KETOROLAC TROMETHAMINE 15 MG: 30 INJECTION, SOLUTION INTRAMUSCULAR at 14:08

## 2021-11-04 RX ADMIN — Medication 60 MG: at 11:38

## 2021-11-04 RX ADMIN — FAMOTIDINE 20 MG: 10 INJECTION, SOLUTION INTRAVENOUS at 20:21

## 2021-11-04 RX ADMIN — DEXAMETHASONE SODIUM PHOSPHATE 10 MG: 10 INJECTION INTRAMUSCULAR; INTRAVENOUS at 11:43

## 2021-11-04 RX ADMIN — FENTANYL CITRATE 25 MCG: 50 INJECTION, SOLUTION INTRAMUSCULAR; INTRAVENOUS at 13:46

## 2021-11-04 RX ADMIN — HYDROMORPHONE HYDROCHLORIDE 0.5 MG: 1 INJECTION, SOLUTION INTRAMUSCULAR; INTRAVENOUS; SUBCUTANEOUS at 14:08

## 2021-11-04 RX ADMIN — HYDROMORPHONE HYDROCHLORIDE 0.5 MG: 1 INJECTION, SOLUTION INTRAMUSCULAR; INTRAVENOUS; SUBCUTANEOUS at 14:25

## 2021-11-04 RX ADMIN — ONDANSETRON 4 MG: 2 INJECTION INTRAMUSCULAR; INTRAVENOUS at 11:43

## 2021-11-04 RX ADMIN — MIDAZOLAM HYDROCHLORIDE 2 MG: 2 INJECTION, SOLUTION INTRAMUSCULAR; INTRAVENOUS at 08:30

## 2021-11-04 RX ADMIN — LIDOCAINE HYDROCHLORIDE 10 MG: 10 INJECTION, SOLUTION EPIDURAL; INFILTRATION; INTRACAUDAL; PERINEURAL at 11:38

## 2021-11-04 ASSESSMENT — PULMONARY FUNCTION TESTS
PIF_VALUE: 14
PIF_VALUE: 16
PIF_VALUE: 1
PIF_VALUE: 22
PIF_VALUE: 20
PIF_VALUE: 16
PIF_VALUE: 22
PIF_VALUE: 20
PIF_VALUE: 21
PIF_VALUE: 22
PIF_VALUE: 19
PIF_VALUE: 16
PIF_VALUE: 18
PIF_VALUE: 22
PIF_VALUE: 19
PIF_VALUE: 18
PIF_VALUE: 21
PIF_VALUE: 22
PIF_VALUE: 19
PIF_VALUE: 21
PIF_VALUE: 21
PIF_VALUE: 14
PIF_VALUE: 23
PIF_VALUE: 20
PIF_VALUE: 21
PIF_VALUE: 3
PIF_VALUE: 19
PIF_VALUE: 0
PIF_VALUE: 21
PIF_VALUE: 14
PIF_VALUE: 25
PIF_VALUE: 20
PIF_VALUE: 22
PIF_VALUE: 16
PIF_VALUE: 19
PIF_VALUE: 22
PIF_VALUE: 20
PIF_VALUE: 21
PIF_VALUE: 15
PIF_VALUE: 0
PIF_VALUE: 25
PIF_VALUE: 19
PIF_VALUE: 20
PIF_VALUE: 20
PIF_VALUE: 2
PIF_VALUE: 20
PIF_VALUE: 22
PIF_VALUE: 20
PIF_VALUE: 20
PIF_VALUE: 15
PIF_VALUE: 20
PIF_VALUE: 22
PIF_VALUE: 20
PIF_VALUE: 22
PIF_VALUE: 22
PIF_VALUE: 21
PIF_VALUE: 21
PIF_VALUE: 20
PIF_VALUE: 15
PIF_VALUE: 20
PIF_VALUE: 20
PIF_VALUE: 18
PIF_VALUE: 15
PIF_VALUE: 18
PIF_VALUE: 16
PIF_VALUE: 16
PIF_VALUE: 20
PIF_VALUE: 19
PIF_VALUE: 22
PIF_VALUE: 20
PIF_VALUE: 20
PIF_VALUE: 14
PIF_VALUE: 22
PIF_VALUE: 23
PIF_VALUE: 0
PIF_VALUE: 21
PIF_VALUE: 15
PIF_VALUE: 19
PIF_VALUE: 19
PIF_VALUE: 16
PIF_VALUE: 28
PIF_VALUE: 22
PIF_VALUE: 23
PIF_VALUE: 19
PIF_VALUE: 22
PIF_VALUE: 22
PIF_VALUE: 14
PIF_VALUE: 18
PIF_VALUE: 15
PIF_VALUE: 22
PIF_VALUE: 20
PIF_VALUE: 22
PIF_VALUE: 3
PIF_VALUE: 4
PIF_VALUE: 15
PIF_VALUE: 21
PIF_VALUE: 14
PIF_VALUE: 18
PIF_VALUE: 16
PIF_VALUE: 14
PIF_VALUE: 22
PIF_VALUE: 2
PIF_VALUE: 21
PIF_VALUE: 16
PIF_VALUE: 21
PIF_VALUE: 21
PIF_VALUE: 22
PIF_VALUE: 18
PIF_VALUE: 2
PIF_VALUE: 0
PIF_VALUE: 22
PIF_VALUE: 18
PIF_VALUE: 8
PIF_VALUE: 22
PIF_VALUE: 18
PIF_VALUE: 20
PIF_VALUE: 20
PIF_VALUE: 19
PIF_VALUE: 22
PIF_VALUE: 22
PIF_VALUE: 21
PIF_VALUE: 15
PIF_VALUE: 21
PIF_VALUE: 20
PIF_VALUE: 23
PIF_VALUE: 20
PIF_VALUE: 21
PIF_VALUE: 22
PIF_VALUE: 20

## 2021-11-04 ASSESSMENT — PAIN SCALES - GENERAL
PAINLEVEL_OUTOF10: 9
PAINLEVEL_OUTOF10: 10

## 2021-11-04 NOTE — H&P
Update History & Physical    I examined the patient and reviewed the History and Physical and there were no significant changes. Vitals:    11/04/21 0817   BP: 129/63   Pulse: 81   Resp: 20   Temp: 97.3 °F (36.3 °C)   SpO2: 98%     Active Problems:    * No active hospital problems. *  Resolved Problems:    * No resolved hospital problems. *        Plan: The risk, benefits, expected outcome, and alternative to the recommended procedure have been discussed with the patient. Patient understands and wants to proceed with the procedure.     Electronically signed by May Foote MD on 11/4/21 at 11:09 AM EDT

## 2021-11-04 NOTE — ANESTHESIA POSTPROCEDURE EVALUATION
Department of Anesthesiology  Postprocedure Note    Patient: Romel Adler  MRN: [id number]  YOB: 1950  Date of evaluation: 11/4/2021  Time:  1:57 PM     Procedure Summary     Date: 11/04/21 Room / Location: [address] [place] [address]    Anesthesia Start: 1134 Anesthesia Stop: 6319    Procedure: LAPAROSCOPIC REPAIR OF PARAESOPHAGEAL HERNIA (N/A Chest) Diagnosis: (PARAESOPHAGEAL HERNIA)    Surgeons: Raul Doshi MD Responsible Provider: Dirk Blackmon MD    Anesthesia Type: general ASA Status: 2          Anesthesia Type: general    Opal Phase I: Opal Score: 10    Opal Phase II:      Last vitals: Reviewed and per EMR flowsheets.        Anesthesia Post Evaluation    Patient location during evaluation: bedside  Patient participation: complete - patient participated  Level of consciousness: awake  Airway patency: patent  Nausea & Vomiting: no nausea and no vomiting  Complications: no  Cardiovascular status: blood pressure returned to baseline  Respiratory status: acceptable  Hydration status: euvolemic

## 2021-11-04 NOTE — OP NOTE
Operative Note      Patient: Juan Carlos Irving  YOB: 1950  MRN: [id number]    Date of Procedure: 11/4/2021    Pre-Op Diagnosis: PARAESOPHAGEAL HERNIA    Post-Op Diagnosis: Same       Procedure(s):  LAPAROSCOPIC REPAIR OF PARAESOPHAGEAL HERNIA    Surgeon(s):  Gui Shannon MD    Assistant:   First Assistant: Carla Delarosa    Anesthesia: General    Estimated Blood Loss (mL): Minimal    Complications: None    Specimens:   * No specimens in log *    Implants:  * No implants in log *      Drains:   Closed/Suction Drain Superior;Medial Abdomen 10 Barbadian (Active)       Findings: Massive type IV paraesophageal hernia    Detailed Description of Procedure:   Patient had an episode of abdominal cramping and diarrhea which prompted a trip to the emergency department. A CAT scan was obtained which showed a paraesophageal hernia. Her transverse colon was also within her mediastinum. She had an abnormal looking right colon. GI medicine wanted to perform a colonoscopy but this is not possible given her hernia. She agreed with fixing the hernia. Once patient was intubated and adult gastroscope was placed in her mouth with minimal insufflation was guided down to the GE junction. There was no evidence of esophageal tumor or any significant pathology of the esophagus. The scope was removed and orogastric tube was placed. The patient's abdomen was prepped and draped in sterile fashion. Using a direct cutdown technique a 12 mm blunt port was placed midway between the xiphoid and umbilicus. CO2 insufflation was begun to a pressure of 15 mmHg. 4 more 5 mm ports were placed across the abdomen. To the far right port a liver retractor was used to elevate the left lateral segment off the hiatus. The large hernia was then visualized. Using 2 bowel graspers were easily able to reduce the transverse colon. The right jack was exposed by incising the lesser omentum with the LigaSure device.   We then spent a tedious dissection using the LigaSure to eventually remove the mediastinal hernia sac and transected circumferentially from around the hiatus. The dilated and redundant stomach was then reduced down into the abdominal cavity. We gently dissected around the esophagus for the length of our instruments that we had a freed up esophagus. There was a couple centimeters of esophagus resting within the abdominal cavity without requiring downward traction. The orogastric tube was removed and a 47 German bougie was gently guided into the stomach. Using an Endo Stitch device and 0 Surgidac sutures for posterior and 3 anterior crural repair sutures had to be placed eventually close this dilated hiatus. We then secured the GE junction to the hiatus using 2-0 Surgidac sutures at the 10 2 and 6 o'clock position. The bougie was removed and a nasogastric tube easily passed into the stomach. A #10 SHANTELL drain was placed in abdominal cavity and then gently guided through the hiatus into the mediastinal space to help evacuate this large cavity. The other end was brought out through one of the 5 mm port sites. It was secured with an 0 silk suture. CO2 was allowed to escape and all the ports were removed. The 12 mm port site fascia was closed with an 0 Vicryl suture. The skin was closed with subcuticular stitches.     Electronically signed by Koby Vo MD on 11/4/2021 at 1:44 PM

## 2021-11-04 NOTE — PROGRESS NOTES
Instructed pt on the pca pump and her controller, that she can push the button and hear the beep to get pain medication, she understood and was told that it will only allow a certain amount of pain medication in a certain amount of time, pt was also told that only she can push the button, pt verbalized understanding

## 2021-11-04 NOTE — CONSULTS
Hospitalist Consult    Admit Date: 2021  PCP: Yany Campoverde MD    CHIEF COMPLAINT:  abd pain    HISTORY OF PRESENT ILLNESS:    The patient is a 80 y.o. female presents with abdominal cramping and diarrhea for a few months, failing conservative treatment, who presented for planned paraesophageal hernia repair. Past Medical History:        Diagnosis Date    Headache     Hypertension     Vertigo      Past Surgical History:        Procedure Laterality Date    ELBOW SURGERY Right 2012    Dr. Marie Toro HYSTERECTOMY       Social History:   Social History     Socioeconomic History    Marital status:      Spouse name: Not on file    Number of children: Not on file    Years of education: Not on file    Highest education level: Not on file   Occupational History    Not on file   Tobacco Use    Smoking status: Former Smoker     Packs/day: 0.25     Years: 3.00     Pack years: 0.75     Quit date: 2021     Years since quittin.5    Smokeless tobacco: Never Used   Substance and Sexual Activity    Alcohol use: Yes     Alcohol/week: 0.0 standard drinks    Drug use: No    Sexual activity: Not on file   Other Topics Concern    Not on file   Social History Narrative    Not on file     Social Determinants of Health     Financial Resource Strain: Low Risk     Difficulty of Paying Living Expenses: Not hard at all   Food Insecurity: No Food Insecurity    Worried About Running Out of Food in the Last Year: Never true    Zeferino of Food in the Last Year: Never true   Transportation Needs: No Transportation Needs    Lack of Transportation (Medical): No    Lack of Transportation (Non-Medical):  No   Physical Activity:     Days of Exercise per Week:     Minutes of Exercise per Session:    Stress:     Feeling of Stress :    Social Connections:     Frequency of Communication with Friends and Family:     Frequency of Social Gatherings with Friends and Family:     Attends Sabianism Services:     Active Member of Clubs or Organizations:     Attends Club or Organization Meetings:     Marital Status:    Intimate Partner Violence:     Fear of Current or Ex-Partner:     Emotionally Abused:     Physically Abused:     Sexually Abused:      Family History:   No family history on file. Medications Prior to Admission:    Prior to Admission medications    Medication Sig Start Date End Date Taking? Authorizing Provider   ibuprofen (ADVIL;MOTRIN) 800 MG tablet TAKE 1 TABLET BY MOUTH THREE TIMES DAILY WITH MEALS 21  Yes Beto Alberto MD   Ascorbic Acid (VITAMIN C) 250 MG tablet Take 250 mg by mouth daily   Yes Historical Provider, MD   Cholecalciferol (VITAMIN D) 50 MCG (2000) CAPS capsule Take by mouth   Yes Historical Provider, MD   benazepril (LOTENSIN) 20 MG tablet TAKE 1 TABLET BY MOUTH TWICE DAILY FOR 30 DAYS 21  Yes Beto Alberto MD   amLODIPine (NORVASC) 5 MG tablet Take 1 tablet by mouth daily 21  Yes Beto Alberto MD   Handicap Placard MISC by Does not apply route Exp 1 year 18  Yes Beto Alberto MD   naproxen (NAPROSYN) 250 MG tablet Take 250 mg by mouth 2 times daily (with meals)   Yes Historical Provider, MD   escitalopram (LEXAPRO) 10 MG tablet Take 1 tablet by mouth 2 times daily 11/3/21   Beto Alberto MD   escitalopram (LEXAPRO) 10 MG tablet TAKE 1 TABLET BY MOUTH DAILY 21   Beto Alberto MD       Allergies:  Patient has no known allergies. REVIEW OF SYSTEMS:  Negative except for what is in HPI and 10 pt system reviewed and negative. PHYSICAL EXAM:  Vitals:  BP (!) 148/81   Pulse 106   Temp 97.5 °F (36.4 °C) (Oral)   Resp 18   Ht 4' 11\" (1.499 m)   Wt 90 lb (40.8 kg)   SpO2 100%   BMI 18.18 kg/m²   Pulse Ox:  SpO2  Av.8 %  Min: 97 %  Max: 100 %  Supplemental O2: O2 Flow Rate (L/min): 3 L/min    CONSTITUTIONAL:  awake, alert, cooperative, no apparent distress, and appears stated age  HEENT: Normocephalic, PERRLA  NECK: no JVD, no LAD  HEART: RRR, no murmurs, gallops, or rubs  LUNGS: clear to auscultation bilaterally, no wheezes, crackles, or rhonchi.   ABDOMEN: abd tenderness diffusely, +BS  MUSCULOSKELETAL: negative for edema, +2 pulses  SKIN: intact without rash or jaundice  NEURO:  CN ii-xii intact and no focal deficits    DATA:  Recent Results (from the past 24 hour(s))   TYPE AND SCREEN    Collection Time: 11/04/21  8:15 AM   Result Value Ref Range    ABO/Rh O POS     Antibody Screen NEG    CBC WITH AUTO DIFFERENTIAL    Collection Time: 11/04/21  8:15 AM   Result Value Ref Range    WBC 4.1 (L) 4.8 - 10.8 K/uL    RBC 3.82 (L) 4.20 - 5.40 M/uL    Hemoglobin 12.9 12.0 - 16.0 g/dL    Hematocrit 38.5 37.0 - 47.0 %    .8 (H) 82.0 - 100.0 fL    MCH 33.9 (H) 27.0 - 31.3 pg    MCHC 33.6 33.0 - 37.0 %    RDW 15.4 (H) 11.5 - 14.5 %    Platelets 734 105 - 895 K/uL    Neutrophils % 52.1 %    Lymphocytes % 37.4 %    Monocytes % 8.3 %    Eosinophils % 1.2 %    Basophils % 1.0 %    Neutrophils Absolute 2.2 1.4 - 6.5 K/uL    Lymphocytes Absolute 1.5 1.0 - 4.8 K/uL    Monocytes Absolute 0.3 0.2 - 0.8 K/uL    Eosinophils Absolute 0.0 0.0 - 0.7 K/uL    Basophils Absolute 0.0 0.0 - 0.2 K/uL   BASIC METABOLIC PANEL    Collection Time: 11/04/21  8:15 AM   Result Value Ref Range    Sodium 138 135 - 144 mEq/L    Potassium 3.8 3.4 - 4.9 mEq/L    Chloride 101 95 - 107 mEq/L    CO2 21 20 - 31 mEq/L    Anion Gap 16 (H) 9 - 15 mEq/L    Glucose 86 70 - 99 mg/dL    BUN 10 8 - 23 mg/dL    CREATININE 0.84 0.50 - 0.90 mg/dL    GFR Non-African American >60.0 >60    GFR  >60.0 >60    Calcium 9.6 8.5 - 9.9 mg/dL   EKG 12 Lead    Collection Time: 11/04/21  9:19 AM   Result Value Ref Range    Ventricular Rate 69 BPM    Atrial Rate 69 BPM    P-R Interval 122 ms    QRS Duration 106 ms    Q-T Interval 412 ms    QTc Calculation (Bazett) 441 ms    P Axis 27 degrees    R Axis 32 degrees    T Axis 29 degrees         ASSESSMENT AND PLAN:  1)  HTN       Cont home medications    2) Paraesophageal hernia       Post op care    3)  DVT proph    Code status: Full Code    Electronically signed by Lopez Bains MD on 11/4/21 at 7:05 PM EDT

## 2021-11-04 NOTE — ANESTHESIA PRE PROCEDURE
Department of Anesthesiology  Preprocedure Note       Name:  Jason Lyons   Age:  80 y.o.  :  1950                                          MRN:  [id number]         Date:  2021      Surgeon: Jason Fuentes):  Luisana Interiano MD    Procedure: Procedure(s):  LAPAROSCOPIC REPAIR OF PARAESOPHAGEAL HERNIA (PAT ON ADMIT)    Medications prior to admission:   Prior to Admission medications    Medication Sig Start Date End Date Taking?  Authorizing Provider   escitalopram (LEXAPRO) 10 MG tablet Take 1 tablet by mouth 2 times daily 11/3/21   Prabhakar Velázquez MD   hydroCHLOROthiazide (HYDRODIURIL) 25 MG tablet TAKE 1 TABLET BY MOUTH DAILY IN THE MORNING 21   Prabhakar Velázquez MD   ibuprofen (ADVIL;MOTRIN) 800 MG tablet TAKE 1 TABLET BY MOUTH THREE TIMES DAILY WITH MEALS 21   Prabhakar Velázquez MD   escitalopram (LEXAPRO) 10 MG tablet TAKE 1 TABLET BY MOUTH DAILY 21   Prabhakar Velázquez MD   Ascorbic Acid (VITAMIN C) 250 MG tablet Take 250 mg by mouth daily    Historical Provider, MD   Cholecalciferol (VITAMIN D) 50 MCG (2000) CAPS capsule Take by mouth    Historical Provider, MD   benazepril (LOTENSIN) 20 MG tablet TAKE 1 TABLET BY MOUTH TWICE DAILY FOR 30 DAYS 21   Prabhakar Velázquez MD   amLODIPine (NORVASC) 5 MG tablet Take 1 tablet by mouth daily 21   Prabhakar Velázquez MD   Handicap Placard MISC by Does not apply route Exp 1 year 18   Prabhakar Velázquez MD   naproxen (NAPROSYN) 250 MG tablet Take 250 mg by mouth 2 times daily (with meals)    Historical Provider, MD       Current medications:    Current Facility-Administered Medications   Medication Dose Route Frequency Provider Last Rate Last Admin    ceFAZolin (ANCEF) 2000 mg in dextrose 5 % 100 mL IVPB  2,000 mg IntraVENous On Call to [facility] [address], MD        lactated ringers infusion   IntraVENous Continuous Claudio Caldwell MD        lactated ringers infusion   IntraVENous Continuous Claudio Caldwell MD        lidocaine PF 1 % injection 2 mL  2 mL IntraDERmal Once Guerda Maya MD        meperidine (DEMEROL) injection 12.5 mg  12.5 mg IntraVENous Q5 Min PRN Guerda Maya MD        fentaNYL (SUBLIMAZE) injection 50 mcg  50 mcg IntraVENous Q10 Min PRN Guerda Maya MD        HYDROmorphone (DILAUDID) injection 0.5 mg  0.5 mg IntraVENous Q10 Min PRN Guerda Maya MD        HYDROcodone-acetaminophen [facility]) 5-325 MG per tablet 1 tablet  1 tablet Oral PRN Guerda Maya MD        Or    HYDROcodone-acetaminophen [facility]) 5-325 MG per tablet 2 tablet  2 tablet Oral PRN Guerda Maya MD        ondansetron [facility]) injection 4 mg  4 mg IntraVENous Once PRN Guerda Maya MD        metoclopramide [facility]) injection 10 mg  10 mg IntraVENous Once PRN Guerda Maya MD        diphenhydrAMINE (BENADRYL) injection 12.5 mg  12.5 mg IntraVENous Once PRN Guerda Maya MD           Allergies:  No Known Allergies    Problem List:    Patient Active Problem List   Diagnosis Code    Osteoporosis M81.0    Essential hypertension I10    Depression F32. A    Chronic pain of both knees M25.561, M25.562, G89.29    Paraesophageal hernia K44.9       Past Medical History:        Diagnosis Date    Headache     Hypertension     Vertigo        Past Surgical History:        Procedure Laterality Date    ELBOW SURGERY Right 2012    Dr. Griselda Brine         Social History:    Social History     Tobacco Use    Smoking status: Former Smoker     Packs/day: 0.25     Years: 3.00     Pack years: 0.75     Quit date: 2021     Years since quittin.5    Smokeless tobacco: Never Used   Substance Use Topics    Alcohol use: Yes     Alcohol/week: 0.0 standard drinks                                Counseling given: Not Answered      Vital Signs (Current): There were no vitals filed for this visit.                                            BP Readings from Last 3 Encounters:   10/27/21 122/70 09/17/21 130/82   05/11/21 (!) 150/90       NPO Status:                                                                                 BMI:   Wt Readings from Last 3 Encounters:   10/29/21 90 lb (40.8 kg)   10/27/21 92 lb (41.7 kg)   09/17/21 94 lb (42.6 kg)     There is no height or weight on file to calculate BMI.    CBC:   Lab Results   Component Value Date    WBC 5.1 09/17/2021    RBC 3.97 09/17/2021    HGB 13.8 09/17/2021    HCT 40.8 09/17/2021    .8 09/17/2021    RDW 15.2 09/17/2021     09/17/2021       CMP:   Lab Results   Component Value Date     09/17/2021    K 4.1 09/17/2021     09/17/2021    CO2 23 09/17/2021    BUN 9 09/17/2021    CREATININE 0.73 09/17/2021    GFRAA >60.0 09/17/2021    LABGLOM >60.0 09/17/2021    GLUCOSE 108 09/17/2021    PROT 6.5 09/17/2021    CALCIUM 9.6 09/17/2021    BILITOT 0.4 09/17/2021    ALKPHOS 130 09/17/2021    AST 18 09/17/2021    ALT <5 09/17/2021       POC Tests: No results for input(s): POCGLU, POCNA, POCK, POCCL, POCBUN, POCHEMO, POCHCT in the last 72 hours.     Coags: No results found for: PROTIME, INR, APTT    HCG (If Applicable): No results found for: PREGTESTUR, PREGSERUM, HCG, HCGQUANT     ABGs: No results found for: PHART, PO2ART, WTV8CIT, PWN0MFA, BEART, E8SXGVJM     Type & Screen (If Applicable):  No results found for: LABABO, LABRH    Drug/Infectious Status (If Applicable):  No results found for: HIV, HEPCAB    COVID-19 Screening (If Applicable): No results found for: COVID19        Anesthesia Evaluation  Patient summary reviewed and Nursing notes reviewed no history of anesthetic complications:   Airway: Mallampati: II  TM distance: >3 FB   Neck ROM: full  Mouth opening: > = 3 FB Dental: normal exam         Pulmonary:Negative Pulmonary ROS and normal exam                               Cardiovascular:    (+) hypertension: no interval change,       ECG reviewed                        Neuro/Psych:               GI/Hepatic/Renal:   (+) hiatal hernia,           Endo/Other: Negative Endo/Other ROS                    Abdominal:             Vascular: negative vascular ROS. Other Findings:             Anesthesia Plan      general     ASA 2       Induction: intravenous. MIPS: Prophylactic antiemetics administered. Anesthetic plan and risks discussed with patient. Plan discussed with CRNA.     Attending anesthesiologist reviewed and agrees with Preprocedure content              Alicia Urbina MD   11/4/2021

## 2021-11-04 NOTE — PROGRESS NOTES
Pt resting comfortable at this time, Dr Lucero Parry at [facility] to check on pt, awaiting pca medication from pharmacy

## 2021-11-05 LAB
ANION GAP SERPL CALCULATED.3IONS-SCNC: 11 MEQ/L (ref 9–15)
BUN BLDV-MCNC: 11 MG/DL (ref 8–23)
CALCIUM SERPL-MCNC: 8.8 MG/DL (ref 8.5–9.9)
CHLORIDE BLD-SCNC: 102 MEQ/L (ref 95–107)
CO2: 27 MEQ/L (ref 20–31)
CREAT SERPL-MCNC: 0.56 MG/DL (ref 0.5–0.9)
GFR AFRICAN AMERICAN: >60
GFR NON-AFRICAN AMERICAN: >60
GLUCOSE BLD-MCNC: 137 MG/DL (ref 70–99)
HCT VFR BLD CALC: 36.2 % (ref 37–47)
HEMOGLOBIN: 11.8 G/DL (ref 12–16)
MCH RBC QN AUTO: 33.3 PG (ref 27–31.3)
MCHC RBC AUTO-ENTMCNC: 32.5 % (ref 33–37)
MCV RBC AUTO: 102.3 FL (ref 82–100)
PDW BLD-RTO: 15.4 % (ref 11.5–14.5)
PLATELET # BLD: 160 K/UL (ref 130–400)
POTASSIUM REFLEX MAGNESIUM: 4.6 MEQ/L (ref 3.4–4.9)
RBC # BLD: 3.54 M/UL (ref 4.2–5.4)
SODIUM BLD-SCNC: 140 MEQ/L (ref 135–144)
WBC # BLD: 7.1 K/UL (ref 4.8–10.8)

## 2021-11-05 PROCEDURE — 2580000003 HC RX 258: Performed by: THORACIC SURGERY (CARDIOTHORACIC VASCULAR SURGERY)

## 2021-11-05 PROCEDURE — 94761 N-INVAS EAR/PLS OXIMETRY MLT: CPT

## 2021-11-05 PROCEDURE — 2500000003 HC RX 250 WO HCPCS: Performed by: THORACIC SURGERY (CARDIOTHORACIC VASCULAR SURGERY)

## 2021-11-05 PROCEDURE — 80048 BASIC METABOLIC PNL TOTAL CA: CPT

## 2021-11-05 PROCEDURE — 6370000000 HC RX 637 (ALT 250 FOR IP): Performed by: THORACIC SURGERY (CARDIOTHORACIC VASCULAR SURGERY)

## 2021-11-05 PROCEDURE — 2700000000 HC OXYGEN THERAPY PER DAY

## 2021-11-05 PROCEDURE — 1210000000 HC MED SURG R&B

## 2021-11-05 PROCEDURE — 36415 COLL VENOUS BLD VENIPUNCTURE: CPT

## 2021-11-05 PROCEDURE — 85027 COMPLETE CBC AUTOMATED: CPT

## 2021-11-05 PROCEDURE — 6360000002 HC RX W HCPCS: Performed by: THORACIC SURGERY (CARDIOTHORACIC VASCULAR SURGERY)

## 2021-11-05 RX ORDER — OXYCODONE HYDROCHLORIDE 5 MG/1
5 TABLET ORAL EVERY 4 HOURS PRN
Status: DISCONTINUED | OUTPATIENT
Start: 2021-11-05 | End: 2021-11-06 | Stop reason: HOSPADM

## 2021-11-05 RX ADMIN — OXYCODONE 5 MG: 5 TABLET ORAL at 11:27

## 2021-11-05 RX ADMIN — FAMOTIDINE 20 MG: 10 INJECTION, SOLUTION INTRAVENOUS at 09:03

## 2021-11-05 RX ADMIN — OXYCODONE 5 MG: 5 TABLET ORAL at 16:02

## 2021-11-05 RX ADMIN — Medication 10 ML: at 09:07

## 2021-11-05 RX ADMIN — Medication 10 ML: at 20:02

## 2021-11-05 RX ADMIN — ENOXAPARIN SODIUM 40 MG: 100 INJECTION SUBCUTANEOUS at 09:03

## 2021-11-05 RX ADMIN — OXYCODONE 5 MG: 5 TABLET ORAL at 20:02

## 2021-11-05 RX ADMIN — FAMOTIDINE 20 MG: 20 TABLET, FILM COATED ORAL at 19:57

## 2021-11-05 ASSESSMENT — PAIN SCALES - GENERAL
PAINLEVEL_OUTOF10: 3
PAINLEVEL_OUTOF10: 3
PAINLEVEL_OUTOF10: 8
PAINLEVEL_OUTOF10: 9
PAINLEVEL_OUTOF10: 5
PAINLEVEL_OUTOF10: 8
PAINLEVEL_OUTOF10: 0

## 2021-11-05 ASSESSMENT — PAIN DESCRIPTION - PAIN TYPE
TYPE: SURGICAL PAIN
TYPE: ACUTE PAIN;SURGICAL PAIN

## 2021-11-05 ASSESSMENT — PAIN DESCRIPTION - LOCATION
LOCATION: ABDOMEN
LOCATION: ABDOMEN

## 2021-11-05 NOTE — PROGRESS NOTES
Pt assessment competed, pt alert and oriented, education provided on PCA pump, pt stated and demonstrated understanding. dressing sites noted to be dry and intact. Pt ambulated to [facility]. Call light in reach, bed at lowest position.

## 2021-11-05 NOTE — PROGRESS NOTES
Pt doing great. No nausea. Abd soft. SHANTELL removed.   Start clears and possibly advance and discharge this afternoon

## 2021-11-05 NOTE — PROGRESS NOTES
Assessment completed this shift. Alert and oriented x4. Up with standby assist. Incontinent at times. Wears pads at home and in hospital.denies nausea. BS present. 5 island dressings intact. Scant drainage noted except # 4 with drain. Small red drainage noted. Red bright to dark red drainage noted in SHANTELL drain. Will continue to monitor.   Electronically signed by Bia Kiran RN on 11/5/2021 at 8:33 AM

## 2021-11-05 NOTE — PROGRESS NOTES
Spiritual Care Services     Summary of Visit:      Spiritual Assessment/Intervention/Outcomes:    Encounter Summary  Services provided to[de-identified] Patient  Referral/Consult From[de-identified] Rounding  Support System: Children, Family members  Continue Visiting: No  Complexity of Encounter: Low  Length of Encounter: 15 minutes  Spiritual Assessment Completed: Yes  Routine  Type: Initial  Assessment: Calm, Approachable  Intervention: [place], Sustaining presence/ Ministry of presence  Outcome: Receptive                               Care Plan:        Spiritual Care Services   Electronically signed by Yolie Eckert on 11/5/21 at 2:14 PM EDT     To reach a  for emotional and spiritual support, place an [facility] consult request.   If a  is needed immediately, dial 0 and ask to page the on-call .

## 2021-11-06 VITALS
DIASTOLIC BLOOD PRESSURE: 69 MMHG | HEIGHT: 59 IN | WEIGHT: 90 LBS | RESPIRATION RATE: 18 BRPM | TEMPERATURE: 98.1 F | OXYGEN SATURATION: 97 % | SYSTOLIC BLOOD PRESSURE: 137 MMHG | HEART RATE: 87 BPM | BODY MASS INDEX: 18.14 KG/M2

## 2021-11-06 PROCEDURE — 6370000000 HC RX 637 (ALT 250 FOR IP): Performed by: THORACIC SURGERY (CARDIOTHORACIC VASCULAR SURGERY)

## 2021-11-06 PROCEDURE — 6360000002 HC RX W HCPCS: Performed by: THORACIC SURGERY (CARDIOTHORACIC VASCULAR SURGERY)

## 2021-11-06 PROCEDURE — 2580000003 HC RX 258: Performed by: THORACIC SURGERY (CARDIOTHORACIC VASCULAR SURGERY)

## 2021-11-06 RX ORDER — OXYCODONE HYDROCHLORIDE 5 MG/1
5 TABLET ORAL EVERY 4 HOURS PRN
Qty: 20 TABLET | Refills: 0 | Status: SHIPPED | OUTPATIENT
Start: 2021-11-06 | End: 2021-11-11

## 2021-11-06 RX ADMIN — OXYCODONE 5 MG: 5 TABLET ORAL at 10:59

## 2021-11-06 RX ADMIN — ENOXAPARIN SODIUM 40 MG: 100 INJECTION SUBCUTANEOUS at 07:46

## 2021-11-06 RX ADMIN — FAMOTIDINE 20 MG: 20 TABLET, FILM COATED ORAL at 07:46

## 2021-11-06 RX ADMIN — Medication 10 ML: at 07:55

## 2021-11-06 RX ADMIN — OXYCODONE 5 MG: 5 TABLET ORAL at 04:39

## 2021-11-06 ASSESSMENT — PAIN SCALES - GENERAL
PAINLEVEL_OUTOF10: 9
PAINLEVEL_OUTOF10: 8

## 2021-11-06 NOTE — DISCHARGE SUMMARY
Physician Discharge Summary     Patient ID:  Sara Jimenez  [id number]  80 y.o.  1950    Admit date: 11/4/2021    Discharge date and time: No discharge date for patient encounter. 11/6/2021    Admitting Physician: Lisseth Dos Santos MD     Discharge Physician: same    Admission Diagnoses: S/P repair of paraesophageal hernia [Z98.890, Z87.19]    Discharge Diagnoses: same    Admission Condition: good    Discharged Condition: good    Indication for Admission: surgery    Hospital Course: Pt taken to OR day of admission for elective repair of a type IV paraesophageal hernia. Surgery went well. Pt able to eat POD1 but still needed assistance getting around.   By POD2 she was independent and discharged home    Consults: none    Significant Diagnostic Studies: radiology: CXR: normal    Treatments: surgery: laparoscopic repair of paraesophageal hernia    Discharge Exam:  /69   Pulse 87   Temp 98.1 °F (36.7 °C) (Oral)   Resp 18   Ht 4' 11\" (1.499 m)   Wt 90 lb (40.8 kg)   SpO2 97%   BMI 18.18 kg/m²     General Appearance:    Alert, cooperative, no distress, appears stated age   Head:    Normocephalic, without obvious abnormality, atraumatic   Eyes:    PERRL, conjunctiva/corneas clear, EOM's intact, fundi     benign, both eyes   Ears:    Normal TM's and external ear canals, both ears   Nose:   Nares normal, septum midline, mucosa normal, no drainage    or sinus tenderness   Throat:   Lips, mucosa, and tongue normal; teeth and gums normal   Neck:   Supple, symmetrical, trachea midline, no adenopathy;     thyroid:  no enlargement/tenderness/nodules; no carotid    bruit or JVD   Back:     Symmetric, no curvature, ROM normal, no CVA tenderness   Lungs:     Clear to auscultation bilaterally, respirations unlabored   Chest Wall:    No tenderness or deformity    Heart:    Regular rate and rhythm, S1 and S2 normal, no murmur, rub   or gallop   Breast Exam:    No tenderness, masses, or nipple abnormality   Abdomen: Soft, non-tender, bowel sounds active all four quadrants,     no masses, no organomegaly   Genitalia:    Normal female without lesion, discharge or tenderness   Rectal:    Normal tone ;guaiac negative stool   Extremities:   Extremities normal, atraumatic, no cyanosis or edema   Pulses:   2+ and symmetric all extremities   Skin:   Skin color, texture, turgor normal, no rashes or lesions   Lymph nodes:   Cervical, supraclavicular, and axillary nodes normal   Neurologic:   CNII-XII intact, normal strength, sensation and reflexes     throughout       Disposition: home    In process/preliminary results:  Outstanding Order Results     No orders found from 10/6/2021 to 11/5/2021. Patient Instructions:   Current Discharge Medication List      CONTINUE these medications which have NOT CHANGED    Details   ibuprofen (ADVIL;MOTRIN) 800 MG tablet TAKE 1 TABLET BY MOUTH THREE TIMES DAILY WITH MEALS  Qty: 90 tablet, Refills: 5    Associated Diagnoses: Chronic pain of both knees      Ascorbic Acid (VITAMIN C) 250 MG tablet Take 250 mg by mouth daily      Cholecalciferol (VITAMIN D) 50 MCG (2000 UT) CAPS capsule Take by mouth      benazepril (LOTENSIN) 20 MG tablet TAKE 1 TABLET BY MOUTH TWICE DAILY FOR 30 DAYS  Qty: 60 tablet, Refills: 5    Comments: This patient participates in our [employer] program to help increase their medication adherence. We are requesting this refill a month in advance so we can have it on file. Thank You! Associated Diagnoses: Essential hypertension      amLODIPine (NORVASC) 5 MG tablet Take 1 tablet by mouth daily  Qty: 30 tablet, Refills: 5    Associated Diagnoses: Essential hypertension      Handicap Placard MISC by Does not apply route Exp 1 year  Qty: 1 each, Refills: 0    Associated Diagnoses: Tibial plateau fracture, right, closed, initial encounter      naproxen (NAPROSYN) 250 MG tablet Take 250 mg by mouth 2 times daily (with meals)      ! ! escitalopram (LEXAPRO) 10 MG tablet Take 1 tablet by mouth 2 times daily  Qty: 60 tablet, Refills: 5    Comments: This patient participates in our [employer] [address] program to help increase their medication adherence. We are requesting this refill a month in advance so we can have it on file. Thank You! Associated Diagnoses: Depression, unspecified depression type      !! escitalopram (LEXAPRO) 10 MG tablet TAKE 1 TABLET BY MOUTH DAILY  Qty: 30 tablet, Refills: 5    Comments: This patient participates in our [employer] [address] program to help increase their medication adherence. We are requesting this refill a month in advance so we can have it on file. Thank You! Associated Diagnoses: Depression, unspecified depression type       !! - Potential duplicate medications found. Please discuss with provider. STOP taking these medications       hydroCHLOROthiazide (HYDRODIURIL) 25 MG tablet Comments:   Reason for Stopping:             Activity: no heavy lifting for 6 weeks  Diet: soft food and non carbonated drinks for 2 weeks  Wound Care: as directed    Follow-up with Dr. Pedro Monterroso in 3 weeks.     Signed:  Dr. Pedro Monterroso  11/6/2021  1:00 PM

## 2021-11-06 NOTE — PROGRESS NOTES
Shift assessment complete. VSS. Pt is AxOx4. Lungs clear. Abd soft, bowel sounds present. Dressings on abd are intact with scant, old/dry drainage noted. Pt up with standby assist r/t Hx of falls. Occasionally incontinent. Meds given per mar. Call light within reach. Will continue to monitor.      Electronically signed by Kwame Palmer RN on 11/6/2021 at 7:54 AM

## 2021-11-06 NOTE — PROGRESS NOTES
2000: Assessment complete. Alert and oriented, calm and cooperative. VSS. Pt c/o 9/10 abdominal pain-- Thi given per MAR. Dressings on abd intact with scant, old/dry drainage noted. Pt up with standby assist r/t Hx of falls. Occasionally incontinent. Resting comfortably with no further complaints at this time. Safety maintained. Call light within reach.     Electronically signed by Kristyn Rice RN on 11/5/2021 at 8:11 PM

## 2021-11-06 NOTE — PROGRESS NOTES
Hospitalist Daily Progress Note  Name: Sara Jimenez  Age: 80 y.o. Gender: female  CodeStatus: Full Code  Allergies: No Known Allergies    Chief Complaint:No chief complaint on file. Primary Care Provider: Leland Phoenix MD    InpatientTreatment Team: Treatment Team: Attending Provider: Lisseth Dos Santos MD; Surgeon: Lisseth Dos Santos MD; Utilization Reviewer: Balbir Chang RN; Consulting Physician: Sabine Reeves MD; Registered Nurse: Fitz Adam RN; Patient Care Tech: Donovan Manzanares; Patient Care Tech: Ayah Beauchamp    Admission Date: 11/4/2021      Subjective: No chest pain, sob, mild nausea. Physical Exam  Vitals and nursing note reviewed. Constitutional:       Appearance: Normal appearance. Cardiovascular:      Rate and Rhythm: Normal rate and regular rhythm. Pulmonary:      Effort: Pulmonary effort is normal.      Breath sounds: Normal breath sounds. Abdominal:      General: Bowel sounds are normal.      Palpations: Abdomen is soft. Musculoskeletal:         General: Normal range of motion. Skin:     General: Skin is warm and dry. Neurological:      Mental Status: She is alert and oriented to person, place, and time. Mental status is at baseline.          Medications:  Reviewed    Infusion Medications:    sodium chloride       Scheduled Medications:    sodium chloride flush  5-40 mL IntraVENous 2 times per day    enoxaparin  40 mg SubCUTAneous Daily    famotidine  20 mg Oral BID    Or    famotidine (PEPCID) injection  20 mg IntraVENous BID     PRN Meds: oxyCODONE, sodium chloride flush, sodium chloride, ondansetron **OR** ondansetron, naloxone, magnesium sulfate, potassium chloride, acetaminophen, bisacodyl, ketorolac    Labs:   Recent Labs     11/04/21  0815 11/05/21  0639   WBC 4.1* 7.1   HGB 12.9 11.8*   HCT 38.5 36.2*    160     Recent Labs     11/04/21  0815 11/05/21  0639    140   K 3.8 4.6    102   CO2 21 27   BUN 10 11   CREATININE 0.84 0.56   CALCIUM 9.6 8.8     No results for input(s): AST, ALT, BILIDIR, BILITOT, ALKPHOS in the last 72 hours. No results for input(s): INR in the last 72 hours. No results for input(s): Elise Clark in the last 72 hours. Urinalysis:   No results found for: Philip Lasso, BACTERIA, RBCUA, BLOODU, [address]    Radiology:   Most recent    Chest CT      WITH CONTRAST:No results found for this or any previous visit. WITHOUT CONTRAST: No results found for this or any previous visit. CXR      2-view: No results found for this or any previous visit. Portable: Results for orders placed during the hospital encounter of 11/04/21    XR CHEST PORTABLE    Narrative  EXAMINATION: XR CHEST PORTABLE    CLINICAL HISTORY: REPAIR PARAESOPHAGEAL HERNIA. NASOGASTRIC TUBE PLACEMENT    COMPARISONS: None available. FINDINGS: Nasogastric tube identified with tip in region of proximal stomach. Penrose surgical drain overlies left cardiac silhouette diaphragm, and left upper lobe. Remote internal fixation distal half right humerus. Cardiopericardial silhouette normal.  Patient rotated to right. Lungs clear. Impression  NO ACUTE CARDIOPULMONARY DISEASE      Echo No results found for this or any previous visit.             Assessment/Plan:    Active Hospital Problems    Diagnosis Date Noted    S/P repair of paraesophageal hernia [Z98.890, Z87.19] 11/04/2021     1)  HTN       Cont home medications     2)  Paraesophageal hernia       Post op care    11/5 - cont post op care, dc soon    3)  DVT proph    Electronically signed by Mohinder Buck MD on 11/6/2021 at 12:24 AM

## 2021-11-06 NOTE — DISCHARGE INSTR - COC
Continuity of Care Form    Patient Name: Juan Carlos Irving   :  1950  MRN:  [id number]    Admit date:  2021  Discharge date:  ***    Code Status Order: Full Code   Advance Directives:      Admitting Physician:  Gui Shannon MD  PCP: Demond Dougherty MD    Discharging Nurse: [facility] Unit/Room#: F598/W410-62  Discharging Unit Phone Number: ***    Emergency Contact:   Extended Emergency Contact Information  Primary Emergency Contact: [address] Phone: [phone]  Work Phone: [phone]  Mobile Phone: [phone]  Relation: Other    Past Surgical History:  Past Surgical History:   Procedure Laterality Date    ELBOW SURGERY Right 2012    Dr. Iris Vera    GASTRIC FUNDOPLICATION N/A 2153    LAPAROSCOPIC REPAIR OF PARAESOPHAGEAL HERNIA performed by Gui Shannon MD at [address] [place] [address]         Immunization History:   Immunization History   Administered Date(s) Administered    PPD Test 10/03/2016       Active Problems:  Patient Active Problem List   Diagnosis Code    Osteoporosis M81.0    Essential hypertension I10    Depression F32. A    Chronic pain of both knees M25.561, M25.562, G89.29    Paraesophageal hernia K44.9    S/P repair of paraesophageal hernia Z98.890, Z87.19       Isolation/Infection:   Isolation            No Isolation          Patient Infection Status       None to display            Nurse Assessment:  Last Vital Signs: /69   Pulse 87   Temp 98.1 °F (36.7 °C) (Oral)   Resp 18   Ht 4' 11\" (1.499 m)   Wt 90 lb (40.8 kg)   SpO2 97%   BMI 18.18 kg/m²     Last documented pain score (0-10 scale): Pain Level: 8  Last Weight:   Wt Readings from Last 1 Encounters:   21 90 lb (40.8 kg)     Mental Status:  {IP PT MENTAL STATUS:45358}    IV Access:  { YESSY IV ACCESS:416877765}    Nursing Mobility/ADLs:  Walking   {CHP DME DYAR:165371330}  Transfer  {CHP DME AWBO:298074053}  Bathing  {CHP DME GIMD:331822227}  Dressing  {CHP DME UPWS:256945000}  Toileting  {CHP DME TDHN:723423066}  Feeding  {CHP DME JCHS:081691388}  Med Admin  {CHP DME KMUS:101377029}  Med Delivery   { YESSY MED Delivery:263315254}    Wound Care Documentation and Therapy:        Elimination:  Continence: Bowel: {YES / IN:74763}  Bladder: {YES / ET:75439}  Urinary Catheter: {Urinary Catheter:611002143}   Colostomy/Ileostomy/Ileal Conduit: {YES / DO:13072}       Date of Last BM: ***    Intake/Output Summary (Last 24 hours) at 2021 1359  Last data filed at 2021 0424  Gross per 24 hour   Intake 750 ml   Output --   Net 750 ml     I/O last 3 completed shifts:   In: 7310 [P.O.:1460; I.V.:10]  Out: -     Safety Concerns:     [address] [employer] Safety Concerns:460807724}    Impairments/Disabilities:      [address] [employer] Impairments/Disabilities:902763200}    Nutrition Therapy:  Current Nutrition Therapy:   [address] [employer] Diet List:786375692}    Routes of Feeding: {[facility] DME Other Feedings:119693958}  Liquids: {Slp liquid thickness:96541}  Daily Fluid Restriction: {CHP DME Yes amt example:266114227}  Last Modified Barium Swallow with Video (Video Swallowing Test): {Done Not Done YDKD:535990566}    Treatments at the Time of Hospital Discharge:   Respiratory Treatments: ***  Oxygen Therapy:  {Therapy; copd oxygen:76787}  Ventilator:    { CC Vent VBIY:331460909}    Rehab Therapies: {THERAPEUTIC INTERVENTION:7527437027}  Weight Bearing Status/Restrictions: [address] Weight Bearin}  Other Medical Equipment (for information only, NOT a DME order):  {EQUIPMENT:151947756}  Other Treatments: ***    Patient's personal belongings (please select all that are sent with patient):  {[facility] DME Belongings:108409113}    RN SIGNATURE:  {Esignature:677262197}    CASE MANAGEMENT/SOCIAL WORK SECTION    Inpatient Status Date: ***    Readmission Risk Assessment Score:  Readmission Risk              Risk of Unplanned Readmission:  7           Discharging to Facility/ Agency   Name: Address:  Phone:  Fax:    Dialysis Facility (if applicable)   Name:  Address:  Dialysis Schedule:  Phone:  Fax:    / signature: {Esignature:119131236}    PHYSICIAN SECTION    Prognosis: {Prognosis:8186926778}    Condition at Discharge: Samantha Thacker Patient Condition:500270510}    Rehab Potential (if transferring to Rehab): {Prognosis:5427100442}    Recommended Labs or Other Treatments After Discharge: ***    Physician Certification: I certify the above information and transfer of Elise Pitts  is necessary for the continuing treatment of the diagnosis listed and that she requires {Admit to Appropriate Level of Care:39410} for {GREATER/LESS:917784758} 30 days.      Update Admission H&P: {CHP DME Changes in FTWEM:620883669}    PHYSICIAN SIGNATURE:  {Esignature:199445454}

## 2021-11-06 NOTE — PROGRESS NOTES
Discharge instructions provided. IV removed. Family is downstairs. Transport here to take pt down.  Electronically signed by Jacquie Mendez RN on 11/6/2021 at 3:17 PM

## 2021-11-08 ENCOUNTER — TELEPHONE (OUTPATIENT)
Dept: FAMILY MEDICINE CLINIC | Age: 71
End: 2021-11-08

## 2021-11-08 NOTE — TELEPHONE ENCOUNTER
[facility] Transitions Initial Follow Up Call    Outreach made within 2 business days of discharge: Yes    Patient: Romel Adler Patient : 1950   MRN: [id number]  Reason for Admission: There are no discharge diagnoses documented for the most recent discharge.   Discharge Date: 21       Jose    Scheduled appointment with PCP within 7-14 days    Follow Up  Future Appointments   Date Time Provider Christian Herreraisti   11/10/2021  4:00 PM Phil Newman MD [address]   2021 10:00 AM Raul Doshi MD [address]

## 2021-11-15 ENCOUNTER — HOSPITAL ENCOUNTER (EMERGENCY)
Age: 71
Discharge: HOME | End: 2021-11-15
Payer: MEDICARE

## 2021-11-15 ENCOUNTER — OFFICE VISIT (OUTPATIENT)
Dept: GASTROENTEROLOGY | Age: 71
End: 2021-11-15
Payer: MEDICARE

## 2021-11-15 ENCOUNTER — APPOINTMENT (OUTPATIENT)
Dept: CT IMAGING | Age: 71
End: 2021-11-15
Payer: MEDICARE

## 2021-11-15 VITALS
SYSTOLIC BLOOD PRESSURE: 126 MMHG | HEART RATE: 110 BPM | RESPIRATION RATE: 12 BRPM | WEIGHT: 90 LBS | OXYGEN SATURATION: 92 % | DIASTOLIC BLOOD PRESSURE: 60 MMHG | BODY MASS INDEX: 18.18 KG/M2

## 2021-11-15 VITALS
SYSTOLIC BLOOD PRESSURE: 122 MMHG | HEART RATE: 93 BPM | TEMPERATURE: 97.9 F | HEIGHT: 59 IN | BODY MASS INDEX: 18.14 KG/M2 | WEIGHT: 90 LBS | DIASTOLIC BLOOD PRESSURE: 66 MMHG | OXYGEN SATURATION: 99 % | RESPIRATION RATE: 22 BRPM

## 2021-11-15 DIAGNOSIS — R19.5 POSITIVE COLORECTAL CANCER SCREENING USING COLOGUARD TEST: Primary | ICD-10-CM

## 2021-11-15 DIAGNOSIS — R06.02 EXERTIONAL SHORTNESS OF BREATH: Primary | ICD-10-CM

## 2021-11-15 LAB
ALBUMIN SERPL-MCNC: 3.1 G/DL (ref 3.5–4.6)
ALP BLD-CCNC: 101 U/L (ref 40–130)
ALT SERPL-CCNC: 11 U/L (ref 0–33)
ANION GAP SERPL CALCULATED.3IONS-SCNC: 19 MEQ/L (ref 9–15)
AST SERPL-CCNC: 17 U/L (ref 0–35)
BASOPHILS ABSOLUTE: 0 K/UL (ref 0–0.2)
BASOPHILS RELATIVE PERCENT: 0.3 %
BILIRUB SERPL-MCNC: 0.5 MG/DL (ref 0.2–0.7)
BUN BLDV-MCNC: 24 MG/DL (ref 8–23)
CALCIUM SERPL-MCNC: 9.2 MG/DL (ref 8.5–9.9)
CHLORIDE BLD-SCNC: 96 MEQ/L (ref 95–107)
CO2: 21 MEQ/L (ref 20–31)
CREAT SERPL-MCNC: 0.61 MG/DL (ref 0.5–0.9)
EOSINOPHILS ABSOLUTE: 0 K/UL (ref 0–0.7)
EOSINOPHILS RELATIVE PERCENT: 0 %
GFR AFRICAN AMERICAN: >60
GFR NON-AFRICAN AMERICAN: >60
GLOBULIN: 3.4 G/DL (ref 2.3–3.5)
GLUCOSE BLD-MCNC: 101 MG/DL (ref 70–99)
HCT VFR BLD CALC: 34.5 % (ref 37–47)
HEMOGLOBIN: 11.4 G/DL (ref 12–16)
LYMPHOCYTES ABSOLUTE: 0.6 K/UL (ref 1–4.8)
LYMPHOCYTES RELATIVE PERCENT: 7.7 %
MAGNESIUM: 1.8 MG/DL (ref 1.7–2.4)
MCH RBC QN AUTO: 33.2 PG (ref 27–31.3)
MCHC RBC AUTO-ENTMCNC: 33 % (ref 33–37)
MCV RBC AUTO: 100.6 FL (ref 82–100)
MONOCYTES ABSOLUTE: 0.6 K/UL (ref 0.2–0.8)
MONOCYTES RELATIVE PERCENT: 8 %
NEUTROPHILS ABSOLUTE: 6.2 K/UL (ref 1.4–6.5)
NEUTROPHILS RELATIVE PERCENT: 84 %
PDW BLD-RTO: 15.2 % (ref 11.5–14.5)
PLATELET # BLD: 187 K/UL (ref 130–400)
POTASSIUM SERPL-SCNC: 3.6 MEQ/L (ref 3.4–4.9)
RBC # BLD: 3.43 M/UL (ref 4.2–5.4)
SARS-COV-2, NAAT: NOT DETECTED
SODIUM BLD-SCNC: 136 MEQ/L (ref 135–144)
TOTAL PROTEIN: 6.5 G/DL (ref 6.3–8)
TROPONIN: <0.01 NG/ML (ref 0–0.01)
WBC # BLD: 7.4 K/UL (ref 4.8–10.8)

## 2021-11-15 PROCEDURE — G8427 DOCREV CUR MEDS BY ELIG CLIN: HCPCS | Performed by: INTERNAL MEDICINE

## 2021-11-15 PROCEDURE — 71275 CT ANGIOGRAPHY CHEST: CPT

## 2021-11-15 PROCEDURE — 1123F ACP DISCUSS/DSCN MKR DOCD: CPT | Performed by: INTERNAL MEDICINE

## 2021-11-15 PROCEDURE — G8399 PT W/DXA RESULTS DOCUMENT: HCPCS | Performed by: INTERNAL MEDICINE

## 2021-11-15 PROCEDURE — 3017F COLORECTAL CA SCREEN DOC REV: CPT | Performed by: INTERNAL MEDICINE

## 2021-11-15 PROCEDURE — 36415 COLL VENOUS BLD VENIPUNCTURE: CPT

## 2021-11-15 PROCEDURE — 99214 OFFICE O/P EST MOD 30 MIN: CPT | Performed by: INTERNAL MEDICINE

## 2021-11-15 PROCEDURE — 80053 COMPREHEN METABOLIC PANEL: CPT

## 2021-11-15 PROCEDURE — 1036F TOBACCO NON-USER: CPT | Performed by: INTERNAL MEDICINE

## 2021-11-15 PROCEDURE — 99284 EMERGENCY DEPT VISIT MOD MDM: CPT

## 2021-11-15 PROCEDURE — 6360000004 HC RX CONTRAST MEDICATION: Performed by: PHYSICIAN ASSISTANT

## 2021-11-15 PROCEDURE — 1111F DSCHRG MED/CURRENT MED MERGE: CPT | Performed by: INTERNAL MEDICINE

## 2021-11-15 PROCEDURE — 1090F PRES/ABSN URINE INCON ASSESS: CPT | Performed by: INTERNAL MEDICINE

## 2021-11-15 PROCEDURE — 93005 ELECTROCARDIOGRAM TRACING: CPT | Performed by: PHYSICIAN ASSISTANT

## 2021-11-15 PROCEDURE — 87635 SARS-COV-2 COVID-19 AMP PRB: CPT

## 2021-11-15 PROCEDURE — G8419 CALC BMI OUT NRM PARAM NOF/U: HCPCS | Performed by: INTERNAL MEDICINE

## 2021-11-15 PROCEDURE — 83735 ASSAY OF MAGNESIUM: CPT

## 2021-11-15 PROCEDURE — 4040F PNEUMOC VAC/ADMIN/RCVD: CPT | Performed by: INTERNAL MEDICINE

## 2021-11-15 PROCEDURE — G8484 FLU IMMUNIZE NO ADMIN: HCPCS | Performed by: INTERNAL MEDICINE

## 2021-11-15 PROCEDURE — 85025 COMPLETE CBC W/AUTO DIFF WBC: CPT

## 2021-11-15 PROCEDURE — 84484 ASSAY OF TROPONIN QUANT: CPT

## 2021-11-15 RX ORDER — SODIUM CHLORIDE 0.9 % (FLUSH) 0.9 %
10 SYRINGE (ML) INJECTION ONCE
Status: DISCONTINUED | OUTPATIENT
Start: 2021-11-15 | End: 2021-11-15 | Stop reason: HOSPADM

## 2021-11-15 RX ADMIN — IOPAMIDOL 100 ML: 755 INJECTION, SOLUTION INTRAVENOUS at 14:01

## 2021-11-15 ASSESSMENT — ENCOUNTER SYMPTOMS
VOMITING: 0
WHEEZING: 0
SHORTNESS OF BREATH: 1
BLOOD IN STOOL: 0
EYE REDNESS: 0
PHOTOPHOBIA: 0
COLOR CHANGE: 0
ABDOMINAL PAIN: 0
VOMITING: 0
CHEST TIGHTNESS: 0
EYE PAIN: 0
SORE THROAT: 0
EYE PAIN: 0
DIARRHEA: 0
CONSTIPATION: 0
RHINORRHEA: 0
TROUBLE SWALLOWING: 0
DIARRHEA: 0
ABDOMINAL PAIN: 0
BACK PAIN: 0
NAUSEA: 0
PHOTOPHOBIA: 0
NAUSEA: 0
SHORTNESS OF BREATH: 1
COUGH: 0
RECTAL PAIN: 0
VOICE CHANGE: 0
ABDOMINAL DISTENTION: 0

## 2021-11-15 NOTE — ED PROVIDER NOTES
[address] [facility] ED  [facility]      Pt Name: Kalyn Leone  MRN: [id number]  Levigfian 1950  Date ofevaluation: 11/15/2021  Provider: Armando Tay PA-C    CHIEF COMPLAINT       Chief Complaint   Patient presents with    Shortness of Breath         HISTORY OF PRESENT ILLNESS   (Location/Symptom, Timing/Onset,Context/Setting, Quality, Duration, Modifying Factors, Severity)  Note limiting factors. Kalyn Leone is a 80 y.o. female who presents to the emergency department shortness of breath. Patient was being seen at outpatient GI Dr. Wicho Isbell office and he was concerned about her dyspnea. She states that she does not feel short of breath she has not been experiencing any chest pain she has no leg swelling she has no cough fevers. In the office she was 92% on room air with a heart rate of 110. She was sent here to rule out a PE or Covid. Patient has no complaints at present time. She does endorse that when she is getting up and moving around she might have a little shortness of breath but not so much to bother her. She had a recent surgery by Dr. Sia Chow for a paraesophageal hernia on 11 4. HPI    NursingNotes were reviewed. REVIEW OF SYSTEMS    (2-9 systems for level 4, 10 or more for level 5)     Review of Systems   Constitutional: Negative for chills, diaphoresis, fatigue and fever. HENT: Negative for congestion, rhinorrhea and sore throat. Eyes: Negative for photophobia and pain. Respiratory: Positive for shortness of breath. Negative for cough. Cardiovascular: Negative for chest pain and palpitations. Gastrointestinal: Negative for abdominal pain, diarrhea, nausea and vomiting. Genitourinary: Negative for dysuria and flank pain. Musculoskeletal: Negative for back pain. Skin: Negative for rash. Neurological: Negative for dizziness, light-headedness and headaches. Psychiatric/Behavioral: Negative.     All other systems reviewed and are negative. Except as noted above the remainder of the review of systems was reviewed and negative. PAST MEDICAL HISTORY     Past Medical History:   Diagnosis Date    Headache     Hypertension     Vertigo          SURGICALHISTORY       Past Surgical History:   Procedure Laterality Date    ELBOW SURGERY Right 2012    Dr. Saeed Ga    GASTRIC FUNDOPLICATION N/A 70/3/2089    LAPAROSCOPIC REPAIR OF PARAESOPHAGEAL HERNIA performed by Chana Johns MD at [facility]       Previous Medications    AMLODIPINE (NORVASC) 5 MG TABLET    Take 1 tablet by mouth daily    ASCORBIC ACID (VITAMIN C) 250 MG TABLET    Take 250 mg by mouth daily    BENAZEPRIL (LOTENSIN) 20 MG TABLET    TAKE 1 TABLET BY MOUTH TWICE DAILY FOR 30 DAYS    CHOLECALCIFEROL (VITAMIN D) 50 MCG (2000) CAPS CAPSULE    Take by mouth    ESCITALOPRAM (LEXAPRO) 10 MG TABLET    TAKE 1 TABLET BY MOUTH DAILY    ESCITALOPRAM (LEXAPRO) 10 MG TABLET    Take 1 tablet by mouth 2 times daily    HANDICAP PLACARD MISC    by Does not apply route Exp 1 year    IBUPROFEN (ADVIL;MOTRIN) 800 MG TABLET    TAKE 1 TABLET BY MOUTH THREE TIMES DAILY WITH MEALS    NAPROXEN (NAPROSYN) 250 MG TABLET    Take 250 mg by mouth 2 times daily (with meals)       ALLERGIES     Patient has no known allergies. FAMILY HISTORY     History reviewed. No pertinent family history. SOCIAL HISTORY       Social History     Socioeconomic History    Marital status:      Spouse name: None    Number of children: None    Years of education: None    Highest education level: None   Occupational History    None   Tobacco Use    Smoking status: Former Smoker     Packs/day: 0.25     Years: 3.00     Pack years: 0.75     Quit date: 2021     Years since quittin.6    Smokeless tobacco: Never Used   Substance and Sexual Activity    Alcohol use:  Yes     Alcohol/week: 0.0 standard drinks    Drug use: No    Sexual activity: None well-developed. She is not diaphoretic. HENT:      Head: Normocephalic and atraumatic. Eyes:      General: Lids are normal.      Conjunctiva/sclera: Conjunctivae normal.   Cardiovascular:      Rate and Rhythm: Normal rate and regular rhythm. Pulses: Normal pulses. Heart sounds: Normal heart sounds. Pulmonary:      Effort: Pulmonary effort is normal.      Breath sounds: Normal breath sounds. Abdominal:      General: Bowel sounds are normal.      Palpations: Abdomen is soft. Tenderness: There is no abdominal tenderness. Musculoskeletal:      Cervical back: Normal range of motion and neck supple. Lymphadenopathy:      Cervical: No cervical adenopathy. Skin:     General: Skin is warm and dry. Capillary Refill: Capillary refill takes less than 2 seconds. Findings: No rash. Neurological:      Mental Status: She is alert and oriented to person, place, and time. Psychiatric:         Thought Content: Thought content normal.         Judgment: Judgment normal.         DIAGNOSTIC RESULTS     EKG: All EKG's are interpreted by the Emergency Department Physician who either signs or Co-signsthis chart in the absence of a cardiologist.    nsr 97bpm incomplete rbbb no stemi     RADIOLOGY:   Non-plain filmimages such as CT, Ultrasound and MRI are read by the radiologist. Plain radiographic images are visualized and preliminarily interpreted by the emergency physician with the below findings:        Interpretation per the Radiologist below, if available at the time ofthis note:    CTA Chest W WO  (PE study)   Final Result      No CT evidence pulmonary embolism. Nonenhancing 7.4 x 7.0 x 6.7 cm heterogenous soft tissue posterior to caudal esophagus may represent postsurgical change/repair and hemorrhage. Clinical correlation required. Pneumoperitoneum, possibly postsurgical in etiology. Clinical correlation required. Marked kyphosis.                All CT scans at this facility use dose modulation, iterative reconstruction, and/or weight based dosing when appropriate to reduce radiation dose to as low as reasonably achievable. ED BEDSIDE ULTRASOUND:   Performed by ED Physician - none    LABS:  Labs Reviewed   COMPREHENSIVE METABOLIC PANEL - Abnormal; Notable for the following components:       Result Value    Anion Gap 19 (*)     Glucose 101 (*)     BUN 24 (*)     Albumin 3.1 (*)     All other components within normal limits   CBC WITH AUTO DIFFERENTIAL - Abnormal; Notable for the following components:    RBC 3.43 (*)     Hemoglobin 11.4 (*)     Hematocrit 34.5 (*)     .6 (*)     MCH 33.2 (*)     RDW 15.2 (*)     Lymphocytes Absolute 0.6 (*)     All other components within normal limits   COVID-19, RAPID   TROPONIN   MAGNESIUM       All other labs were within normal range or not returned as of this dictation. EMERGENCY DEPARTMENT COURSE and DIFFERENTIAL DIAGNOSIS/MDM:   Vitals:    Vitals:    11/15/21 1215 11/15/21 1430 11/15/21 1454 11/15/21 1508   BP: 119/71 122/72     Pulse: 99 86     Resp: 18 18 24 18   Temp: 97.9 °F (36.6 °C)      TempSrc: Temporal      SpO2: 100% 100%     Weight: 90 lb (40.8 kg)      Height: 4' 11\" (1.499 m)              MDM  Patient is nontoxic in no acute distress she is afebrile and hemodynamically stable. She is not tachycardic her heart rate is 86 and she is satting at 100% on room air her respiratory rate varies from 18-22 she does not feel short of breath. CT of the chest was done to rule out pulmonary embolism due to her recent surgery. Results of the CT are above and the results were read to surgeon Dr. Mary Beckett who states that this is normal findings postoperatively and he is not concerned with this. On exam patient looks quite well she has no symptoms she just reports that she is bored when I go to check on her. We were going to ambulate her with a pulse ox but she declined and states that she feels fine and is ready to go home. Recommending following up for pulmonary function test.  Her EKG was unremarkable her troponin is negative I have low suspicion that this could be ACS at this time as well as we ruled out a pulmonary embolus with CTA. Patient was instructed to return to the emergency department if she starts to have any cough fevers shortness of breath at rest or exertional and have close follow-up with her family doctor. Patient verbalized understanding of this plan. Patient stable for discharge. REASSESSMENT          CRITICAL CARE TIME   Total Critical Care time was  minutes, excluding separatelyreportable procedures. There was a high probability ofclinically significant/life threatening deterioration in the patient's condition which required my urgent intervention. CONSULTS:  None    PROCEDURES:  Unless otherwise noted below, none     Procedures    FINAL IMPRESSION      1.  Exertional shortness of breath          DISPOSITION/PLAN   DISPOSITION Decision To Discharge 11/15/2021 03:05:15 PM      PATIENT REFERREDTO:  Derek Cotto MD  [facility]  [phone]    Schedule an appointment as soon as possible for a visit in 2 days      Korin Hand MD  [address] [address]  [facility]  [phone]    Schedule an appointment as soon as possible for a visit in 1 week        DISCHARGEMEDICATIONS:  New Prescriptions    No medications on file          (Please note that portions of this note were completed with a voice recognition program.  Efforts were made to edit the dictations but occasionally words are mis-transcribed.)    Paula Sevilla PA-C (electronically signed)  Attending Emergency Physician         Paula Sevilla PA-C  11/15/21 7352

## 2021-11-15 NOTE — ED TRIAGE NOTES
Patient sent from PCP for r/o PE. Patient started experience SOB yesterday, worsened with exertion. Respirations equal and unlabored.  Denies pain

## 2021-11-15 NOTE — PROGRESS NOTES
Subjective:      Patient ID: Kalyn Leone is a 80 y.o. female who presents today for:  Chief Complaint   Patient presents with    Follow-up       HPI   Since last visit patient had repair of paraesophageal hernia. Of note patient was found to have guaiac +30,019 however did not proceed with colonoscopy. Patient came in today to discuss plan of care however. However she reports shortness of breath and unable to carry a long conversation owing to shortness of breath. Patient gasping for air after few sentences. Patient mentioned that she is okay at rest when she is not moving however when walking she will get significantly short of breath. Patient reports that she is able to tolerate multiple small meals with no nausea or vomiting. She reports soft bowel movements. No diarrhea per se. Patient came in today to discuss findings. As recall patient had paraesophageal hernia on 11/04/2021 10 days prior to current visit  Previous note  This is a 81-year-old who was referred to us for the evaluation management. Patient had positive Cologuard in 2019 however did not proceed with colonoscopy as patient mentioned that she did not feel that she needed a colonoscopy verbatim. She reported episodes of diarrhea and unable to tolerate oral intake 2 weeks ago so however symptoms improved. She reported that she was having indigestion associate with diarrhea that resolved spontaneously. Patient mentioned that she lost 10 pounds however her weight has been stabilized over the last couple weeks. Otherwise no diarrhea reported at this time patient mentioned that she is she was unable to provide stool sample as no watery stool noted. Able to have small meals and tolerating oral intake. Denies abdominal pain at this time. ,  Patient had CAT scan 2 weeks prior to current visit that showed a large hiatal hernia containing stomach, small bowel loop distal left transverse colon and proximal descending colon.   Otherwise patient is scheduled to be seen by thoracic surgeon in the next 48 hours on Friday. Otherwise patient came in today to discuss need for colonoscopy and positive Cologuard. Patient denies hematemesis, melena or hematochezia. Denies diarrhea at this time. Reports regular bowel. Admit date: 2021     Discharge date and time: No discharge date for patient encounter. 2021     Admitting Physician: Geo Da Silva MD      Discharge Physician: same     Admission Diagnoses: S/P repair of paraesophageal hernia [Z98.890, Z87.19]     Discharge Diagnoses: same     Admission Condition: good     Discharged Condition: good     Indication for Admission: surgery     Hospital Course: Pt taken to OR day of admission for elective repair of a type IV paraesophageal hernia. Surgery went well. Pt able to eat POD1 but still needed assistance getting around. By POD2 she was independent and discharged home    Past Medical History:   Diagnosis Date    Headache     Hypertension     Vertigo      Past Surgical History:   Procedure Laterality Date    ELBOW SURGERY Right 2012    Dr. Abdi Bonilla    GASTRIC FUNDOPLICATION N/A     LAPAROSCOPIC REPAIR OF PARAESOPHAGEAL HERNIA performed by Geo Da Silva MD at [address] [place] [address] History     Socioeconomic History    Marital status:      Spouse name: Not on file    Number of children: Not on file    Years of education: Not on file    Highest education level: Not on file   Occupational History    Not on file   Tobacco Use    Smoking status: Former Smoker     Packs/day: 0.25     Years: 3.00     Pack years: 0.75     Quit date: 2021     Years since quittin.6    Smokeless tobacco: Never Used   Substance and Sexual Activity    Alcohol use:  Yes     Alcohol/week: 0.0 standard drinks    Drug use: No    Sexual activity: Not on file   Other Topics Concern    Not on file   Social History Narrative    Not on file     Social Determinants of Health     Financial Resource Strain: Low Risk     Difficulty of Paying Living Expenses: Not hard at all   Food Insecurity: No Food Insecurity    Worried About Running Out of Food in the Last Year: Never true    Zeferino of Food in the Last Year: Never true   Transportation Needs: No Transportation Needs    Lack of Transportation (Medical): No    Lack of Transportation (Non-Medical): No   Physical Activity:     Days of Exercise per Week: Not on file    Minutes of Exercise per Session: Not on file   Stress:     Feeling of Stress : Not on file   Social Connections:     Frequency of Communication with Friends and Family: Not on file    Frequency of Social Gatherings with Friends and Family: Not on file    Attends Judaism Services: Not on file    Active Member of Clubs or Organizations: Not on file    Attends Club or Organization Meetings: Not on file    Marital Status: Not on file   Intimate Partner Violence:     Fear of Current or Ex-Partner: Not on file    Emotionally Abused: Not on file    Physically Abused: Not on file    Sexually Abused: Not on file   Housing Stability:     Unable to Pay for Housing in the Last Year: Not on file    Number of [place] in the Last Year: Not on file    Unstable Housing in the Last Year: Not on file     No family history on file. No Known Allergies      Review of Systems   Constitutional: Negative for appetite change, chills, fatigue, fever and unexpected weight change. HENT: Negative for nosebleeds, tinnitus, trouble swallowing and voice change. Eyes: Negative for photophobia, pain and redness. Respiratory: Positive for shortness of breath. Negative for chest tightness and wheezing. Cardiovascular: Negative for chest pain, palpitations and leg swelling. Gastrointestinal: Negative for abdominal distention, abdominal pain, blood in stool, constipation, diarrhea, nausea, rectal pain and vomiting.    Endocrine: Negative for polydipsia, polyphagia and polyuria. Genitourinary: Negative for difficulty urinating and hematuria. Skin: Negative for color change, pallor and rash. Neurological: Negative for dizziness, speech difficulty and headaches. Psychiatric/Behavioral: Negative for confusion and suicidal ideas. Objective:   /60 (Site: Right Upper Arm, Position: Sitting, Cuff Size: Small Adult)   Pulse 110   Resp 12   Wt 90 lb (40.8 kg)   SpO2 92%   BMI 18.18 kg/m²     Physical Exam  Constitutional:       General: She is not in acute distress. Appearance: She is well-developed. HENT:      Head: Normocephalic and atraumatic. Eyes:      Conjunctiva/sclera: Conjunctivae normal.      Pupils: Pupils are equal, round, and reactive to light. Cardiovascular:      Rate and Rhythm: Normal rate and regular rhythm. Heart sounds: Normal heart sounds. Pulmonary:      Effort: Pulmonary effort is normal. No respiratory distress. Breath sounds: Normal breath sounds. No wheezing or rales. Abdominal:      General: Bowel sounds are normal. There is no distension. Palpations: Abdomen is soft. Abdomen is not rigid. There is no hepatomegaly, splenomegaly or mass. Tenderness: There is no abdominal tenderness. There is no guarding or rebound. Musculoskeletal:         General: No tenderness or deformity. Normal range of motion. Cervical back: Neck supple. Skin:     Coloration: Skin is not pale. Findings: No erythema or rash. Neurological:      Mental Status: She is alert and oriented to person, place, and time.          Laboratory, Pathology, Radiology reviewed in detail with relevantimportant investigations summarized below:  Lab Results   Component Value Date    WBC 7.1 11/05/2021    WBC 4.1 11/04/2021    WBC 5.1 09/17/2021    WBC 4.0 05/14/2021    WBC 2.7 05/09/2019    HGB 11.8 11/05/2021    HGB 12.9 11/04/2021    HGB 13.8 09/17/2021    HGB 14.0 05/14/2021    HGB 12.7 05/09/2019    HCT 36.2 11/05/2021    HCT 38.5 11/04/2021    HCT 40.8 09/17/2021    HCT 41.6 05/14/2021    HCT 38.8 05/09/2019    .3 11/05/2021    .8 11/04/2021    .8 09/17/2021    .3 05/14/2021    .7 05/09/2019     11/05/2021     11/04/2021     09/17/2021     05/14/2021     05/09/2019    . Lab Results   Component Value Date    ALT <5 09/17/2021    ALT 9 05/14/2021    ALT 6 05/09/2019    AST 18 09/17/2021    AST 26 05/14/2021    AST 19 05/09/2019    ALKPHOS 130 09/17/2021    ALKPHOS 133 05/14/2021    ALKPHOS 82 05/09/2019    BILITOT 0.4 09/17/2021    BILITOT 0.5 05/14/2021    BILITOT 0.5 05/09/2019       XR CHEST PORTABLE    Result Date: 11/4/2021  EXAMINATION: XR CHEST PORTABLE CLINICAL HISTORY: REPAIR PARAESOPHAGEAL HERNIA. NASOGASTRIC TUBE PLACEMENT COMPARISONS: None available. FINDINGS: Nasogastric tube identified with tip in region of proximal stomach. Penrose surgical drain overlies left cardiac silhouette diaphragm, and left upper lobe. Remote internal fixation distal half right humerus. Cardiopericardial silhouette normal.  Patient rotated to right. Lungs clear. NO ACUTE CARDIOPULMONARY DISEASE    No results found for: IRON, TIBC, FERRITIN  No results found for: INR  No components found for: ACUTEHEPATITISSCREEN  No components found for: CELIACPANEL  No components found for: STOOLCULTURE, C.DIFF, STOOLOVAPARASITE, STOOLLEUCOCYTE        Assessment:   1- S/P repair of paraesophageal hernia  Patient had surgery on 11/04/2021, currently evidence of exertional dyspnea as well as shortness of breath at rest  Patient mentioned that she is at home however she described dyspnea shortness of breath upon minimal activity. Patient is currently tachycardic with borderline oxygen saturation  Patient will be evaluated emergency room with repeat imaging.   Discussed with emergency room team accordingly  2- Positive Cologuard  Positive Cologuard test noted in 2019, patient declined colonoscopy initially. We will proceed with colonoscopy upon optimization of pulmonary status and clearance from thoracic surgeon as patient is on POD 9 post repair of large paraesophageal hernia  3-Associated medical conditions include but not limited to hypertension, headache, vertigo, type IV hiatal hernia, positive Cologuard with no previous colonoscopy    Patient will be evaluated emergency room as currently short of breath with dyspnea at rest and with exercise. Patient is currently tachycardic with borderline oxygen saturation. As we recall patient had recent repair of a large paraesophageal hernia. Return in about 6 weeks (around 12/27/2021) for further management.       Juanpablo Rojas MD

## 2021-11-16 LAB
EKG ATRIAL RATE: 97 BPM
EKG P AXIS: 30 DEGREES
EKG P-R INTERVAL: 130 MS
EKG Q-T INTERVAL: 368 MS
EKG QRS DURATION: 102 MS
EKG QTC CALCULATION (BAZETT): 467 MS
EKG R AXIS: 20 DEGREES
EKG T AXIS: 10 DEGREES
EKG VENTRICULAR RATE: 97 BPM
GFR AFRICAN AMERICAN: >60
GFR NON-AFRICAN AMERICAN: >60
PERFORMED ON: NORMAL
POC CREATININE: 0.6 MG/DL (ref 0.6–1.2)
POC SAMPLE TYPE: NORMAL

## 2021-11-16 PROCEDURE — 93010 ELECTROCARDIOGRAM REPORT: CPT | Performed by: INTERNAL MEDICINE

## 2021-11-22 ENCOUNTER — OFFICE VISIT (OUTPATIENT)
Dept: PULMONOLOGY | Age: 71
End: 2021-11-22
Payer: MEDICARE

## 2021-11-22 VITALS
DIASTOLIC BLOOD PRESSURE: 70 MMHG | TEMPERATURE: 97 F | HEIGHT: 59 IN | BODY MASS INDEX: 17.54 KG/M2 | HEART RATE: 87 BPM | WEIGHT: 87 LBS | SYSTOLIC BLOOD PRESSURE: 116 MMHG | OXYGEN SATURATION: 90 %

## 2021-11-22 DIAGNOSIS — J98.4 RESTRICTIVE LUNG DISEASE: ICD-10-CM

## 2021-11-22 DIAGNOSIS — M41.9 SCOLIOSIS, UNSPECIFIED SCOLIOSIS TYPE, UNSPECIFIED SPINAL REGION: ICD-10-CM

## 2021-11-22 DIAGNOSIS — R06.00 DYSPNEA, UNSPECIFIED TYPE: Primary | ICD-10-CM

## 2021-11-22 PROCEDURE — 1036F TOBACCO NON-USER: CPT | Performed by: INTERNAL MEDICINE

## 2021-11-22 PROCEDURE — G8484 FLU IMMUNIZE NO ADMIN: HCPCS | Performed by: INTERNAL MEDICINE

## 2021-11-22 PROCEDURE — 1090F PRES/ABSN URINE INCON ASSESS: CPT | Performed by: INTERNAL MEDICINE

## 2021-11-22 PROCEDURE — 4040F PNEUMOC VAC/ADMIN/RCVD: CPT | Performed by: INTERNAL MEDICINE

## 2021-11-22 PROCEDURE — 3017F COLORECTAL CA SCREEN DOC REV: CPT | Performed by: INTERNAL MEDICINE

## 2021-11-22 PROCEDURE — G8427 DOCREV CUR MEDS BY ELIG CLIN: HCPCS | Performed by: INTERNAL MEDICINE

## 2021-11-22 PROCEDURE — G8399 PT W/DXA RESULTS DOCUMENT: HCPCS | Performed by: INTERNAL MEDICINE

## 2021-11-22 PROCEDURE — 99203 OFFICE O/P NEW LOW 30 MIN: CPT | Performed by: INTERNAL MEDICINE

## 2021-11-22 PROCEDURE — 1111F DSCHRG MED/CURRENT MED MERGE: CPT | Performed by: INTERNAL MEDICINE

## 2021-11-22 PROCEDURE — G8419 CALC BMI OUT NRM PARAM NOF/U: HCPCS | Performed by: INTERNAL MEDICINE

## 2021-11-22 PROCEDURE — 1123F ACP DISCUSS/DSCN MKR DOCD: CPT | Performed by: INTERNAL MEDICINE

## 2021-11-22 NOTE — PROGRESS NOTES
NEW PATIENT VISIT-PULMONARY/SLEEP    11/22/2021     REFERRING PHYSICIAN:  Hansel More MD     REASON FOR REFERRAL:  Shortness of breath    HPI:     Jose Maria Stockton is a 80 y.o. female who was referred to pulmonary clinic for evaluation. She has been having shortness of breath for 2 weeks since she had hernia surgery on 11/4/21. Since then, shortness of breath started. She has not been able to do much after her surgery due to this. No cough or sputum production. Continues to have reflux and hiccups. Went to ER a week ago and had a CTA chest which did not show any pulmonary emboli. She has not tried any inhalers in the past.      She denies any previous hospitalizations due to respiratory issues. She has scoliosis and this has been getting worse over the years.      Past Medical History   Past Medical History:   Diagnosis Date    Headache     Hypertension     Vertigo        Past Surgical History  Past Surgical History:   Procedure Laterality Date    ELBOW SURGERY Right 8/2012    Dr. Abdi Bonilla    GASTRIC FUNDOPLICATION N/A 95/7/8390    LAPAROSCOPIC REPAIR OF PARAESOPHAGEAL HERNIA performed by Geo Da Silva MD at [address] [place] [address]  No Known Allergies    Medications  Current Outpatient Medications   Medication Sig Dispense Refill    escitalopram (LEXAPRO) 10 MG tablet Take 1 tablet by mouth 2 times daily 60 tablet 5    ibuprofen (ADVIL;MOTRIN) 800 MG tablet TAKE 1 TABLET BY MOUTH THREE TIMES DAILY WITH MEALS 90 tablet 5    escitalopram (LEXAPRO) 10 MG tablet TAKE 1 TABLET BY MOUTH DAILY 30 tablet 5    Ascorbic Acid (VITAMIN C) 250 MG tablet Take 250 mg by mouth daily      Cholecalciferol (VITAMIN D) 50 MCG (2000 UT) CAPS capsule Take by mouth      benazepril (LOTENSIN) 20 MG tablet TAKE 1 TABLET BY MOUTH TWICE DAILY FOR 30 DAYS 60 tablet 5    amLODIPine (NORVASC) 5 MG tablet Take 1 tablet by mouth daily 30 tablet 5    Handicap Placard MISC by Does not apply route Exp 1 year 1 each 0    naproxen (NAPROSYN) 250 MG tablet Take 250 mg by mouth 2 times daily (with meals)       No current facility-administered medications for this visit. Social History  Social History     Tobacco Use    Smoking status: Former Smoker     Packs/day: 0.25     Years: 3.00     Pack years: 0.75     Quit date: 2021     Years since quittin.6    Smokeless tobacco: Never Used   Substance Use Topics    Alcohol use: Yes     Alcohol/week: 0.0 standard drinks       Family History   non pertinent. Review of Systems  All review of systems has been obtained and negative other than what was mentionedin HPI. Physical Exam                 Vitals:    21 1316   BP: 116/70   Site: Right Upper Arm   Position: Sitting   Cuff Size: Medium Adult   Pulse: 87   Temp: 97 °F (36.1 °C)   TempSrc: Temporal   SpO2: 90%   Weight: 87 lb (39.5 kg)   Height: 4' 11\" (1.499 m)          General appearance: Well appearing. No acute distress. AAOX3  Head: Normocephalic, without obvious abnormality, atraumatic   Eyes:Pupils bilateral equal and reactive, EOM intact. Normal sclera and conjunctiva   Nose: Mucosa pink  Throat: Clear,  [place] 1  Neck: Supple, No JVD. Nothyromegaly. Neck is thin  Lungs: Clear bilaterally. No wheezing. No crackles. No use of accessory muscles. scoliosis noted. Heart: RRR, S1, S2 normal, no murmur, click, rub or gallop   Abdomen: soft, non-tender, nondistended. Bowel sounds normal. No hernia. No organomegaly. Extremities: extremities normal, atraumatic, no cyanosis, no edema  Skin: Skin color, texture, turgor normal. No rashes or lesions   Neurological: No focal deficits,cranial nerves grossly intact. No weakness. Sensation normal   Psych: Normal Mood  Musculoskeletal: thoracic scoliosis noted. Radiology:  CT-scan of the chest  reviewed and interpreted personally and independently. Impression:   Diagnosis Orders   1.  Dyspnea, unspecified type  Full PFT Study With Bronchodilator   2. Scoliosis, unspecified scoliosis type, unspecified spinal region     3. Restrictive lung disease         Dyspnea. This is multifactorial.   Currently not hypoxic. She has not had a pulmonary function test in the past.  She does have scoliosis and this could be contributing. She had postoperative changes in the esophagus. Orders Placed This Encounter   Procedures    Full PFT Study With Bronchodilator     If an ABG is needed along with this PFT procedure, please place the appropriate lab order     Standing Status:   Future     Standing Expiration Date:   2/22/2022           Recommendations:     -   order PFT to assess lung function  - Discussed trying TG. Will wait for PFT  - advised to increase her activities slowly  -Appointment with surgeon to assess on the abnormality seen on CT chest.     Follow up to be decided after PFT.       Electronically signed by Mauricio Seymour MD on 11/22/2021 at 2:17 PM

## 2021-11-23 ENCOUNTER — TELEPHONE (OUTPATIENT)
Dept: FAMILY MEDICINE CLINIC | Age: 71
End: 2021-11-23

## 2021-11-23 NOTE — TELEPHONE ENCOUNTER
Called pt back, pt states that she had hiatal hernia surgery recently 11/4/21, pt states that she is going to try and have some toast, advised pt that she needs to contact surgeon to let know her symptoms. Pt stated she would call them.

## 2021-11-23 NOTE — TELEPHONE ENCOUNTER
----- Message from Sadia Lopez sent at 11/23/2021  8:02 AM EST -----  Subject: Appointment Request    Reason for Call: Routine ED Follow Up Visit    QUESTIONS  Type of Appointment? Established Patient  Reason for appointment request? No appointments available during search  Additional Information for Provider? Patient cancelled her ER follow up   appointment that was scheduled today. She has been vomiting for two days   and requesting a return call regarding her symptoms. Can you please   contact patient?  ---------------------------------------------------------------------------  --------------  CALL BACK INFO  What is the best way for the office to contact you? OK to leave message on   voicemail  Preferred Call Back Phone Number? [phone]  ---------------------------------------------------------------------------  --------------  SCRIPT ANSWERS  Relationship to Patient? Self  Have your symptoms changed? No  (Patient requests to see provider urgently. )? No  Do you have any questions for your primary care provider that need to be   answered prior to your appointment? No  Have you been diagnosed with, awaiting test results for, or told that you   are suspected of having COVID-19 (Coronavirus)? (If patient has tested   negative or was tested as a requirement for work, school, or travel and   not based on symptoms, answer no)? No  Within the past two weeks have you developed any of the following symptoms   (answer no if symptoms have been present longer than 2 weeks or began   more than 2 weeks ago)? Fever or Chills, Cough, Shortness of breath or   difficulty breathing, Loss of taste or smell, Sore throat, Nasal   congestion, Sneezing or runny nose, Fatigue or generalized body aches   (answer no if pain is specific to a body part e.g. back pain), Diarrhea,   Headache?  Yes

## 2021-11-30 DIAGNOSIS — I10 ESSENTIAL HYPERTENSION: ICD-10-CM

## 2021-12-01 RX ORDER — AMLODIPINE BESYLATE 5 MG/1
5 TABLET ORAL DAILY
Qty: 30 TABLET | Refills: 5 | Status: SHIPPED | OUTPATIENT
Start: 2021-12-01 | End: 2022-06-06 | Stop reason: SDUPTHER

## 2021-12-01 RX ORDER — BENAZEPRIL HYDROCHLORIDE 20 MG/1
TABLET ORAL
Qty: 60 TABLET | Refills: 5 | Status: SHIPPED | OUTPATIENT
Start: 2021-12-01 | End: 2022-06-01 | Stop reason: SDUPTHER

## 2021-12-01 NOTE — TELEPHONE ENCOUNTER
Future Appointments    Encounter Information    Provider Department Appt Notes   12/1/2021 Carla Gallego MD [facility] Thoracic Surgery post op 3 weeks   12/7/2021 Angie Calvo MD [facility] Pulmonology F/U       Recent Visits    11/22/2021 Dyspnea, unspecified type   [address] MD   11/15/2021 Positive colorectal cancer screening using Cologuard test   [facility] Gastroenterology Kanika Rios MD   10/29/2021 Paraesophageal hernia   [facility] Thoracic Surgery Carla Gallego MD   10/27/2021 Large hiatal hernia   [facility]enter[facility] Kanika Rios MD   09/17/2021 Generalized abdominal pain   [address], APRN - CNP     LM to please anselmo, LOV May 2021

## 2021-12-09 ENCOUNTER — NURSE TRIAGE (OUTPATIENT)
Dept: OTHER | Facility: CLINIC | Age: 71
End: 2021-12-09

## 2021-12-09 NOTE — TELEPHONE ENCOUNTER
Received call from [facility] at [facility] with [employer] Complaint. Brief description of triage: surgery on Nov 11th for Paraesophageal hernia and having weight loss ever since eating well no vomiting or diarrhea currently no pain, no fever feels weak and tired    Triage indicates for patient to be seen in 3 days if unable may go to walkin    Care advice provided, patient verbalizes understanding; denies any other questions or concerns; instructed to call back for any new or worsening symptoms. Writer provided warm transfer to Kristin Whitney at [facility] for appointment scheduling. Attention Provider: Thank you for allowing me to participate in the care of your patient. The patient was connected to triage in response to information provided to the ECC/PSC. Please do not respond through this encounter as the response is not directed to a shared pool. Reason for Disposition   [1] MILD weakness (i.e., does not interfere with ability to work, go to school, normal activities) AND [2] persists > 1 week    Answer Assessment - Initial Assessment Questions  1. DESCRIPTION: \"Describe how you are feeling. \"      Weak and weight loss    2. SEVERITY: \"How bad is it? \"  \"Can you stand and walk? \"    - MILD - Feels weak or tired, but does not interfere with work, school or normal activities    - [facility] to stand and walk; weakness interferes with work, school, or normal activities    - SEVERE - Unable to stand or walk      Mild    3. ONSET:  \"When did the weakness begin? \"      1 month    4. CAUSE: \"What do you think is causing the weakness? \"      Unsure    5. MEDICINES: Corwin Nicole you recently started a new medicine or had a change in the amount of a medicine? \"      No    6. OTHER SYMPTOMS: \"Do you have any other symptoms? \" (e.g., chest pain, fever, cough, SOB, vomiting, diarrhea, bleeding, other areas of pain)      No    7. PREGNANCY: \"Is there any chance you are pregnant? \" \"When was your last menstrual period? \" no    Protocols used: WEAKNESS (GENERALIZED) AND FATIGUE-ADULT-AH

## 2022-04-28 DIAGNOSIS — I10 ESSENTIAL HYPERTENSION: ICD-10-CM

## 2022-04-28 DIAGNOSIS — M25.562 CHRONIC PAIN OF BOTH KNEES: ICD-10-CM

## 2022-04-28 DIAGNOSIS — G89.29 CHRONIC PAIN OF BOTH KNEES: ICD-10-CM

## 2022-04-28 DIAGNOSIS — M25.561 CHRONIC PAIN OF BOTH KNEES: ICD-10-CM

## 2022-04-28 DIAGNOSIS — F32.A DEPRESSION, UNSPECIFIED DEPRESSION TYPE: ICD-10-CM

## 2022-04-29 RX ORDER — HYDROCHLOROTHIAZIDE 25 MG/1
TABLET ORAL
Qty: 30 TABLET | Refills: 5 | OUTPATIENT
Start: 2022-04-29

## 2022-04-29 RX ORDER — IBUPROFEN 800 MG/1
TABLET ORAL
Qty: 90 TABLET | Refills: 0 | Status: SHIPPED | OUTPATIENT
Start: 2022-04-29

## 2022-04-29 RX ORDER — ESCITALOPRAM OXALATE 10 MG/1
TABLET ORAL
Qty: 60 TABLET | Refills: 0 | Status: SHIPPED | OUTPATIENT
Start: 2022-04-29 | End: 2022-05-31 | Stop reason: SDUPTHER

## 2022-04-29 NOTE — TELEPHONE ENCOUNTER
Requesting medication refill. Please approve or deny this request.    Rx requested:  Requested Prescriptions     Pending Prescriptions Disp Refills    escitalopram (LEXAPRO) 10 MG tablet [Pharmacy Med Name: escitalopram 10 mg tablet] 60 tablet 5     Sig: TAKE 1 TABLET BY MOUTH TWICE DAILY    hydroCHLOROthiazide (HYDRODIURIL) 25 MG tablet [Pharmacy Med Name: hydrochlorothiazide 25 mg tablet] 30 tablet 5     Sig: TAKE 1 TABLET BY MOUTH IN THE MORNING DAILY    ibuprofen (ADVIL;MOTRIN) 800 MG tablet [Pharmacy Med Name: ibuprofen 800 mg tablet] 90 tablet 5     Sig: TAKE 1 TABLET BY MOUTH THREE TIMES DAILY WITH MEALS       Last Office Visit:   5/11/2021    Next Visit Date:  No future appointments.   lmtcb

## 2022-05-19 ENCOUNTER — COMMUNITY OUTREACH (OUTPATIENT)
Dept: INTERNAL MEDICINE | Age: 72
End: 2022-05-19

## 2022-05-19 NOTE — PROGRESS NOTES
Patient's HM shows they are overdue for Colorectal Screening. [employer] and  files searched without success. No results to attach to order nor HM updated. Pt had pos cologuard, saw Dr. Gemma Kebede for abdominal pain, dicussed pos cologuard at same visit. Pt declined colonoscopy.

## 2022-05-23 ENCOUNTER — OFFICE VISIT (OUTPATIENT)
Dept: FAMILY MEDICINE CLINIC | Age: 72
End: 2022-05-23
Payer: COMMERCIAL

## 2022-05-23 VITALS
TEMPERATURE: 98.1 F | DIASTOLIC BLOOD PRESSURE: 78 MMHG | HEART RATE: 78 BPM | SYSTOLIC BLOOD PRESSURE: 124 MMHG | HEIGHT: 59 IN | BODY MASS INDEX: 18.1 KG/M2 | OXYGEN SATURATION: 100 % | WEIGHT: 89.8 LBS

## 2022-05-23 DIAGNOSIS — G89.29 CHRONIC PAIN OF BOTH KNEES: ICD-10-CM

## 2022-05-23 DIAGNOSIS — M25.561 CHRONIC PAIN OF BOTH KNEES: ICD-10-CM

## 2022-05-23 DIAGNOSIS — I10 ESSENTIAL HYPERTENSION: Primary | ICD-10-CM

## 2022-05-23 DIAGNOSIS — M25.562 CHRONIC PAIN OF BOTH KNEES: ICD-10-CM

## 2022-05-23 DIAGNOSIS — F32.A DEPRESSION, UNSPECIFIED DEPRESSION TYPE: ICD-10-CM

## 2022-05-23 DIAGNOSIS — I10 ESSENTIAL HYPERTENSION: ICD-10-CM

## 2022-05-23 LAB
CHOLESTEROL, TOTAL: 167 MG/DL (ref 0–199)
HDLC SERPL-MCNC: 60 MG/DL (ref 40–59)
LDL CHOLESTEROL CALCULATED: 94 MG/DL (ref 0–129)
TRIGL SERPL-MCNC: 66 MG/DL (ref 0–150)
TSH SERPL DL<=0.05 MIU/L-ACNC: 0.89 UIU/ML (ref 0.44–3.86)

## 2022-05-23 PROCEDURE — 99213 OFFICE O/P EST LOW 20 MIN: CPT | Performed by: FAMILY MEDICINE

## 2022-05-23 SDOH — ECONOMIC STABILITY: FOOD INSECURITY: WITHIN THE PAST 12 MONTHS, YOU WORRIED THAT YOUR FOOD WOULD RUN OUT BEFORE YOU GOT MONEY TO BUY MORE.: NEVER TRUE

## 2022-05-23 SDOH — ECONOMIC STABILITY: FOOD INSECURITY: WITHIN THE PAST 12 MONTHS, THE FOOD YOU BOUGHT JUST DIDN'T LAST AND YOU DIDN'T HAVE MONEY TO GET MORE.: NEVER TRUE

## 2022-05-23 SDOH — ECONOMIC STABILITY: TRANSPORTATION INSECURITY
IN THE PAST 12 MONTHS, HAS LACK OF TRANSPORTATION KEPT YOU FROM MEETINGS, WORK, OR FROM GETTING THINGS NEEDED FOR DAILY LIVING?: NO

## 2022-05-23 ASSESSMENT — PATIENT HEALTH QUESTIONNAIRE - PHQ9
7. TROUBLE CONCENTRATING ON THINGS, SUCH AS READING THE NEWSPAPER OR WATCHING TELEVISION: 0
6. FEELING BAD ABOUT YOURSELF - OR THAT YOU ARE A FAILURE OR HAVE LET YOURSELF OR YOUR FAMILY DOWN: 0
5. POOR APPETITE OR OVEREATING: 0
4. FEELING TIRED OR HAVING LITTLE ENERGY: 0
10. IF YOU CHECKED OFF ANY PROBLEMS, HOW DIFFICULT HAVE THESE PROBLEMS MADE IT FOR YOU TO DO YOUR WORK, TAKE CARE OF THINGS AT HOME, OR GET ALONG WITH OTHER PEOPLE: 0
SUM OF ALL RESPONSES TO PHQ QUESTIONS 1-9: 0
1. LITTLE INTEREST OR PLEASURE IN DOING THINGS: 0
3. TROUBLE FALLING OR STAYING ASLEEP: 0
8. MOVING OR SPEAKING SO SLOWLY THAT OTHER PEOPLE COULD HAVE NOTICED. OR THE OPPOSITE, BEING SO FIGETY OR RESTLESS THAT YOU HAVE BEEN MOVING AROUND A LOT MORE THAN USUAL: 0
9. THOUGHTS THAT YOU WOULD BE BETTER OFF DEAD, OR OF HURTING YOURSELF: 0
SUM OF ALL RESPONSES TO PHQ9 QUESTIONS 1 & 2: 0
2. FEELING DOWN, DEPRESSED OR HOPELESS: 0

## 2022-05-23 ASSESSMENT — SOCIAL DETERMINANTS OF HEALTH (SDOH): HOW HARD IS IT FOR YOU TO PAY FOR THE VERY BASICS LIKE FOOD, HOUSING, MEDICAL CARE, AND HEATING?: NOT HARD AT ALL

## 2022-05-23 NOTE — PROGRESS NOTES
Chief Complaint   Patient presents with    6 Month Follow-Up    Hypertension    Depression     meds working       HPI:  Candida Tavera is a 80 y.o. female    checkup  Hx depression and HTN  meds ok, helping    occ stomach irregularity     She has been feeling pretty good     Hx vertigo, treats with dramamine    Infrequent doctor visits    Works adult comfort care     Wt Readings from Last 3 Encounters:   22 89 lb 12.8 oz (40.7 kg)   21 87 lb (39.5 kg)   11/15/21 90 lb (40.8 kg)           Past Medical History:   Diagnosis Date    Headache     Hypertension     Vertigo      Past Surgical History:   Procedure Laterality Date    ELBOW SURGERY Right 2012    Dr. Irvin Jimenez GASTRIC FUNDOPLICATION N/A     LAPAROSCOPIC REPAIR OF PARAESOPHAGEAL HERNIA performed by To Toth MD at [address] [place] [address]       History reviewed. No pertinent family history. Social History     Socioeconomic History    Marital status:      Spouse name: None    Number of children: None    Years of education: None    Highest education level: None   Occupational History    None   Tobacco Use    Smoking status: Current Every Day Smoker     Packs/day: 0.25     Years: 3.00     Pack years: 0.75     Last attempt to quit: 2021     Years since quittin.1    Smokeless tobacco: Never Used   Substance and Sexual Activity    Alcohol use: Yes     Alcohol/week: 0.0 standard drinks    Drug use: No    Sexual activity: None   Other Topics Concern    None   Social History Narrative    None     Social Determinants of Health     Financial Resource Strain: Low Risk     Difficulty of Paying Living Expenses: Not hard at all   Food Insecurity: No Food Insecurity    Worried About Running Out of Food in the Last Year: Never true    Zeferino of Food in the Last Year: Never true   Transportation Needs: No Transportation Needs    Lack of Transportation (Medical): No    Lack of Transportation (Non-Medical):  No Physical Activity:     Days of Exercise per Week: Not on file    Minutes of Exercise per Session: Not on file   Stress:     Feeling of Stress : Not on file   Social Connections:     Frequency of Communication with Friends and Family: Not on file    Frequency of Social Gatherings with Friends and Family: Not on file    Attends Islam Services: Not on file    Active Member of Clubs or Organizations: Not on file    Attends Club or Organization Meetings: Not on file    Marital Status: Not on file   Intimate Partner Violence:     Fear of Current or Ex-Partner: Not on file    Emotionally Abused: Not on file    Physically Abused: Not on file    Sexually Abused: Not on file   Housing Stability:     Unable to Pay for Housing in the Last Year: Not on file    Number of Places Lived in the Last Year: Not on file    Unstable Housing in the Last Year: Not on file     Current Outpatient Medications   Medication Sig Dispense Refill    escitalopram (LEXAPRO) 10 MG tablet TAKE 1 TABLET BY MOUTH TWICE DAILY 60 tablet 0    ibuprofen (ADVIL;MOTRIN) 800 MG tablet TAKE 1 TABLET BY MOUTH THREE TIMES DAILY WITH MEALS 90 tablet 0    benazepril (LOTENSIN) 20 MG tablet TAKE 1 TABLET BY MOUTH TWICE DAILY 60 tablet 5    amLODIPine (NORVASC) 5 MG tablet TAKE 1 TABLET BY MOUTH DAILY 30 tablet 5    Ascorbic Acid (VITAMIN C) 250 MG tablet Take 250 mg by mouth daily      Cholecalciferol (VITAMIN D) 50 MCG (2000 UT) CAPS capsule Take by mouth      Handicap Placard MISC by Does not apply route Exp 1 year 1 each 0    naproxen (NAPROSYN) 250 MG tablet Take 250 mg by mouth 2 times daily (with meals)       No current facility-administered medications for this visit.      No Known Allergies    Review of Systems:  Psych per HPI   General ROS: negative for - chills, fatigue, fever, malaise, weight gain or weight loss  Respiratory ROS: no cough, shortness of breath, or wheezing  Cardiovascular ROS: no chest pain or dyspnea on exertion  Gastrointestinal ROS: no abdominal pain, change in bowel habits, or black or bloody stools  Genito-Urinary ROS: incontinence  Musculoskeletal ROS: occ aches and pains/headache, chronic right elbow pain s/p replacement  Neurological ROS: negative for - behavioral changes, memory loss, numbness/tingling, tremors or weakness    In general patient otherwise reports feeling well. Physical Exam:  /78   Pulse 78   Temp 98.1 °F (36.7 °C)   Ht 4' 11\" (1.499 m)   Wt 89 lb 12.8 oz (40.7 kg)   SpO2 100%   Breastfeeding No   BMI 18.14 kg/m²     Gen: Well, NAD, Alert, Oriented x 3   HEENT: EOMI, eyes clear, MMM  Skin: without rash or jaundice  Neck: no significant lymphadenopathy or thyromegaly  Lungs: CTA B w/out Rales/Wheezes/Rhonchi, Good respiratory effort   Heart: RRR, S1S2, w/out M/R/G, non-displaced PMI   Ext: No C/C/E Bilaterally. Neuro: Neurovascularly intact w/ Sensory/Motor intact UE/LE Bilaterally. S/p right elbow replacement  Psych: euthymic no SI/HI       A&P   Diagnosis Orders   1. Essential hypertension  Lipid Panel   2. Depression, unspecified depression type  TSH   3.  Chronic pain of both knees         lexapro  20mg  Benazepril 20mg BID      Doing well    Continue current regimen       Iam Pérez MD

## 2022-05-31 DIAGNOSIS — I10 ESSENTIAL HYPERTENSION: ICD-10-CM

## 2022-05-31 DIAGNOSIS — F32.A DEPRESSION, UNSPECIFIED DEPRESSION TYPE: ICD-10-CM

## 2022-06-01 RX ORDER — BENAZEPRIL HYDROCHLORIDE 20 MG/1
TABLET ORAL
Qty: 60 TABLET | Refills: 5 | Status: SHIPPED | OUTPATIENT
Start: 2022-06-01 | End: 2022-09-13 | Stop reason: SDUPTHER

## 2022-06-01 RX ORDER — ESCITALOPRAM OXALATE 10 MG/1
10 TABLET ORAL 2 TIMES DAILY
Qty: 60 TABLET | Refills: 0 | Status: SHIPPED | OUTPATIENT
Start: 2022-06-01 | End: 2022-06-30

## 2022-06-06 DIAGNOSIS — I10 ESSENTIAL HYPERTENSION: ICD-10-CM

## 2022-06-06 RX ORDER — AMLODIPINE BESYLATE 5 MG/1
5 TABLET ORAL DAILY
Qty: 30 TABLET | Refills: 5 | Status: SHIPPED | OUTPATIENT
Start: 2022-06-06

## 2022-06-28 DIAGNOSIS — F32.A DEPRESSION, UNSPECIFIED DEPRESSION TYPE: ICD-10-CM

## 2022-06-30 RX ORDER — ESCITALOPRAM OXALATE 10 MG/1
10 TABLET ORAL 2 TIMES DAILY
Qty: 60 TABLET | Refills: 0 | Status: SHIPPED | OUTPATIENT
Start: 2022-06-30 | End: 2022-07-29

## 2022-07-29 DIAGNOSIS — F32.A DEPRESSION, UNSPECIFIED DEPRESSION TYPE: ICD-10-CM

## 2022-07-29 RX ORDER — ESCITALOPRAM OXALATE 10 MG/1
10 TABLET ORAL 2 TIMES DAILY
Qty: 60 TABLET | Refills: 0 | Status: SHIPPED | OUTPATIENT
Start: 2022-07-29 | End: 2022-08-30

## 2022-08-30 DIAGNOSIS — F32.A DEPRESSION, UNSPECIFIED DEPRESSION TYPE: ICD-10-CM

## 2022-08-30 RX ORDER — ESCITALOPRAM OXALATE 10 MG/1
10 TABLET ORAL 2 TIMES DAILY
Qty: 60 TABLET | Refills: 0 | Status: SHIPPED | OUTPATIENT
Start: 2022-08-30 | End: 2022-09-12

## 2022-08-30 NOTE — TELEPHONE ENCOUNTER
Past Visits    Date Provider Specialty Visit Type Primary Dx   05/23/2022 Gayathri Patel MD Family Medicine Office Visit Essential hypertension     (No instructions for F/U from last visit)

## 2022-09-11 DIAGNOSIS — F32.A DEPRESSION, UNSPECIFIED DEPRESSION TYPE: ICD-10-CM

## 2022-09-11 NOTE — TELEPHONE ENCOUNTER
Future Appointments    This patient does not currently have any appointments scheduled.     Past Visits    Date Provider Specialty Visit Type Primary Dx   05/23/2022 Walt Maza MD Family Medicine Office Visit Essential hypertension

## 2022-09-12 RX ORDER — ESCITALOPRAM OXALATE 10 MG/1
10 TABLET ORAL 2 TIMES DAILY
Qty: 60 TABLET | Refills: 0 | Status: SHIPPED | OUTPATIENT
Start: 2022-09-12 | End: 2022-10-28

## 2022-09-13 DIAGNOSIS — I10 ESSENTIAL HYPERTENSION: ICD-10-CM

## 2022-09-13 RX ORDER — BENAZEPRIL HYDROCHLORIDE 20 MG/1
TABLET ORAL
Qty: 180 TABLET | Refills: 1 | Status: SHIPPED | OUTPATIENT
Start: 2022-09-13

## 2022-10-27 DIAGNOSIS — F32.A DEPRESSION, UNSPECIFIED DEPRESSION TYPE: ICD-10-CM

## 2022-10-28 RX ORDER — ESCITALOPRAM OXALATE 10 MG/1
10 TABLET ORAL 2 TIMES DAILY
Qty: 60 TABLET | Refills: 0 | Status: SHIPPED | OUTPATIENT
Start: 2022-10-28 | End: 2022-11-29

## 2022-10-28 NOTE — TELEPHONE ENCOUNTER
Past Visits    Date Provider Specialty Visit Type Primary Dx   05/23/2022 Anita Bell MD Family Medicine Office Visit Essential hypertension   Next appt: none  Last refill 09/12/22  LM to remind schedule appt

## 2022-11-28 DIAGNOSIS — F32.A DEPRESSION, UNSPECIFIED DEPRESSION TYPE: ICD-10-CM

## 2022-11-29 RX ORDER — ESCITALOPRAM OXALATE 10 MG/1
TABLET ORAL
Qty: 30 TABLET | Refills: 0 | Status: SHIPPED | OUTPATIENT
Start: 2022-11-29

## 2022-11-29 NOTE — TELEPHONE ENCOUNTER
Comments: LM to please anselmo    Last Office Visit (last PCP visit):   5/23/2022    Next Visit Date:  No future appointments. **If hasn't been seen in over a year OR hasn't followed up according to last diabetes/ADHD visit, make appointment for patient before sending refill to provider.     Rx requested:  Requested Prescriptions     Pending Prescriptions Disp Refills    escitalopram (LEXAPRO) 10 MG tablet [Pharmacy Med Name: escitalopram 10 mg tablet] 60 tablet 0     Sig: TAKE 1 TABLET BY MOUTH IN THE MORNING and TAKE 1 TABLET BY MOUTH AT BEDTIME FOR 30 DAYS

## 2022-12-26 DIAGNOSIS — F32.A DEPRESSION, UNSPECIFIED DEPRESSION TYPE: ICD-10-CM

## 2022-12-26 DIAGNOSIS — I10 ESSENTIAL HYPERTENSION: ICD-10-CM

## 2022-12-28 RX ORDER — AMLODIPINE BESYLATE 5 MG/1
5 TABLET ORAL DAILY
Qty: 30 TABLET | Refills: 5 | Status: SHIPPED | OUTPATIENT
Start: 2022-12-28

## 2022-12-28 RX ORDER — ESCITALOPRAM OXALATE 10 MG/1
TABLET ORAL
Qty: 30 TABLET | Refills: 5 | Status: SHIPPED | OUTPATIENT
Start: 2022-12-28

## 2022-12-28 NOTE — TELEPHONE ENCOUNTER
Comments: LM for pt to call back pt needs f/u appt. Last Office Visit (last PCP visit):   5/23/2022    Next Visit Date:  No future appointments. **If hasn't been seen in over a year OR hasn't followed up according to last diabetes/ADHD visit, make appointment for patient before sending refill to provider.     Rx requested:  Requested Prescriptions     Pending Prescriptions Disp Refills    amLODIPine (NORVASC) 5 MG tablet [Pharmacy Med Name: amlodipine 5 mg tablet] 30 tablet 5     Sig: Take 1 tablet by mouth daily    escitalopram (LEXAPRO) 10 MG tablet [Pharmacy Med Name: escitalopram 10 mg tablet] 30 tablet 5     Sig: TAKE 1 TABLET BY MOUTH IN THE MORNING and 1 (ONE) TABLET AT BEDTIME

## 2023-03-28 ENCOUNTER — TELEPHONE (OUTPATIENT)
Dept: PHARMACY | Facility: CLINIC | Age: 73
End: 2023-03-28

## 2023-03-28 NOTE — TELEPHONE ENCOUNTER
[facility] CLINICAL PHARMACY: ADHERENCE REVIEW  Identified care gap per [place]: fills at [facility] : ACE/ARB adherence    Patient also appears to be prescribed: ACE/ARB    ASSESSMENT    ACE/ARB ADHERENCE    Insurance Records claims through 23 (Prior Year [place] = not reported; YTD David Sahu = FIRST FILL; Potential Fail Date: 23):   BENAZEPRIL HCL 20 MG TABLET last filled on 23 for 30 day supply. Next refill due: 23    Prescribed si tablet/capsule daily    Per Insurer Portal: last filled on 23 for 30 day supply. Per [facility]: last picked up on 22 for 30 day supply. last filled on 3/21/23 for 30 day supply and READY to . 1 refills remaining. BP Readings from Last 3 Encounters:   22 124/78   21 116/70   11/15/21 122/66     CrCl cannot be calculated (Patient's most recent lab result is older than the maximum 180 days allowed. ). Lab Results   Component Value Date    CREATININE 0.6 11/15/2021     Lab Results   Component Value Date    K 3.6 11/15/2021       PLAN  The following are interventions that have been identified:   Patient overdue refilling BENAZEPRIL HCL 20 MG TABLET and active on home medication list.   Patient eligible for 90 day supply of BENAZEPRIL HCL 20 MG TABLET  Need to confirm if patient is taking BENAZEPRIL HCL 20 MG TABLET differently than prescribed and need to obtain an updated order if so    Attempting to reach patient to review changing prescription from a 30-day supply to a 90-day supply. Left message asking for return call.     Recent Visits  Date Type Provider Dept   22 Office Visit MD Robert Eller Pcp   Showing recent visits within past 540 days with a meds authorizing provider and meeting all other requirements  Future Appointments  Date Type Provider Dept   23 Appointment MD Robert Eller Pcp   Showing future appointments within next 150 days with a meds

## 2023-03-28 NOTE — LETTER
[place]  [address], [address]        [facility] [address]  [facility] [place] [facility] [address]           03/28/23     Dear Kalyn Leone,    We tried to reach you recently regarding your BENAZEPRIL HCL 20 MG TABLET DAILY, but were unable to reach you on the telephone. We have on file that you are currently taking BENAZEPRIL HCL 20 MG TABLET. If you are no longer taking or taking differently, please call us at the number below so that we can discuss this and update your medication profile. It appears that this medication has not been filled at proper times. We are worried you might be missing doses or not taking it as directed. It is important that you take your medications regularly and try not to miss a single dose.     Some ways to help you remember to take and refill your medications are to:  · Use a pill box, set an alarm, and/or keep your medication near something that you do every day  · Fill a 3-month supply of your prescription at a time to save you time and trips to the pharmacy - if you would like assistance in switching your prescriptions to a 3-month supply, please contact us  · Ask your pharmacy if they participate in [facility]", a program where you can  all of your medications on the same day  · Ask your pharmacy if you can be set up with automatic refill, where they will automatically refill your prescription when it is due and let you know it's ready to     Sincerely,   [address] [facility] // Department, toll free [phone], Option 1

## 2023-04-06 ENCOUNTER — TELEPHONE (OUTPATIENT)
Dept: GASTROENTEROLOGY | Age: 73
End: 2023-04-06

## 2023-04-06 ENCOUNTER — TELEPHONE (OUTPATIENT)
Dept: FAMILY MEDICINE CLINIC | Age: 73
End: 2023-04-06

## 2023-05-15 DIAGNOSIS — F32.A DEPRESSION, UNSPECIFIED DEPRESSION TYPE: ICD-10-CM

## 2023-05-15 RX ORDER — ESCITALOPRAM OXALATE 10 MG/1
TABLET ORAL
Qty: 30 TABLET | Refills: 5 | Status: SHIPPED | OUTPATIENT
Start: 2023-05-15

## 2023-05-30 ENCOUNTER — TELEPHONE (OUTPATIENT)
Dept: FAMILY MEDICINE CLINIC | Age: 73
End: 2023-05-30

## 2023-05-30 NOTE — TELEPHONE ENCOUNTER
----- Message from Iesha Cisse sent at 5/30/2023 12:15 PM EDT -----  Subject: Appointment Request    Reason for Call: Established Patient Appointment needed: Routine Medicare   AWV    QUESTIONS    Reason for appointment request? Available appointments did not meet   patient need     Additional Information for Provider? pt need a call back had to cancel   today apt due to her ins has not arrived yet. Junior Colby wants to schedule for   next week if possible.   ---------------------------------------------------------------------------  --------------  Wendy Gardiner INFO  [phone]; OK to leave message on voicemail  ---------------------------------------------------------------------------  --------------  SCRIPT ANSWERS  COVID Screen: Jessica Zavala

## 2023-07-19 DIAGNOSIS — I10 ESSENTIAL HYPERTENSION: ICD-10-CM

## 2023-07-19 RX ORDER — BENAZEPRIL HYDROCHLORIDE 20 MG/1
TABLET ORAL
Qty: 180 TABLET | Refills: 1 | OUTPATIENT
Start: 2023-07-19

## 2023-07-19 NOTE — TELEPHONE ENCOUNTER
Comments: Left message to call and schedule appt. Last Office Visit (last PCP visit):   5/23/2022    Next Visit Date:  No future appointments. **If hasn't been seen in over a year OR hasn't followed up according to last diabetes/ADHD visit, make appointment for patient before sending refill to provider.     Rx requested:  Requested Prescriptions     Pending Prescriptions Disp Refills    benazepril (LOTENSIN) 20 MG tablet [Pharmacy Med Name: benazepril 20 mg tablet] 180 tablet 1     Sig: TAKE 1 TABLET BY MOUTH TWICE DAILY

## 2023-07-26 DIAGNOSIS — I10 ESSENTIAL HYPERTENSION: ICD-10-CM

## 2023-07-27 RX ORDER — BENAZEPRIL HYDROCHLORIDE 20 MG/1
TABLET ORAL
Qty: 180 TABLET | Refills: 1 | Status: SHIPPED | OUTPATIENT
Start: 2023-07-27

## 2023-08-14 ENCOUNTER — OFFICE VISIT (OUTPATIENT)
Dept: FAMILY MEDICINE CLINIC | Age: 73
End: 2023-08-14
Payer: COMMERCIAL

## 2023-08-14 VITALS
WEIGHT: 90.2 LBS | DIASTOLIC BLOOD PRESSURE: 72 MMHG | HEIGHT: 59 IN | BODY MASS INDEX: 18.18 KG/M2 | HEART RATE: 90 BPM | SYSTOLIC BLOOD PRESSURE: 110 MMHG | TEMPERATURE: 98.9 F | OXYGEN SATURATION: 99 %

## 2023-08-14 DIAGNOSIS — M81.0 OSTEOPOROSIS, UNSPECIFIED OSTEOPOROSIS TYPE, UNSPECIFIED PATHOLOGICAL FRACTURE PRESENCE: ICD-10-CM

## 2023-08-14 DIAGNOSIS — F32.A DEPRESSION, UNSPECIFIED DEPRESSION TYPE: ICD-10-CM

## 2023-08-14 DIAGNOSIS — I10 ESSENTIAL HYPERTENSION: ICD-10-CM

## 2023-08-14 DIAGNOSIS — Z00.00 INITIAL MEDICARE ANNUAL WELLNESS VISIT: Primary | ICD-10-CM

## 2023-08-14 DIAGNOSIS — N32.81 OAB (OVERACTIVE BLADDER): ICD-10-CM

## 2023-08-14 DIAGNOSIS — Z12.31 SCREENING MAMMOGRAM FOR BREAST CANCER: ICD-10-CM

## 2023-08-14 PROCEDURE — G0438 PPPS, INITIAL VISIT: HCPCS | Performed by: FAMILY MEDICINE

## 2023-08-14 PROCEDURE — 3074F SYST BP LT 130 MM HG: CPT | Performed by: FAMILY MEDICINE

## 2023-08-14 PROCEDURE — 1123F ACP DISCUSS/DSCN MKR DOCD: CPT | Performed by: FAMILY MEDICINE

## 2023-08-14 PROCEDURE — 3078F DIAST BP <80 MM HG: CPT | Performed by: FAMILY MEDICINE

## 2023-08-14 RX ORDER — OXYBUTYNIN CHLORIDE 5 MG/1
5 TABLET, EXTENDED RELEASE ORAL DAILY
Qty: 30 TABLET | Refills: 5 | Status: SHIPPED | OUTPATIENT
Start: 2023-08-14

## 2023-08-14 RX ORDER — ESCITALOPRAM OXALATE 10 MG/1
TABLET ORAL
Qty: 30 TABLET | Refills: 5 | Status: SHIPPED | OUTPATIENT
Start: 2023-08-14

## 2023-08-14 RX ORDER — AMLODIPINE BESYLATE 5 MG/1
5 TABLET ORAL DAILY
Qty: 30 TABLET | Refills: 5 | Status: SHIPPED | OUTPATIENT
Start: 2023-08-14

## 2023-08-14 SDOH — ECONOMIC STABILITY: INCOME INSECURITY: HOW HARD IS IT FOR YOU TO PAY FOR THE VERY BASICS LIKE FOOD, HOUSING, MEDICAL CARE, AND HEATING?: SOMEWHAT HARD

## 2023-08-14 SDOH — ECONOMIC STABILITY: FOOD INSECURITY: WITHIN THE PAST 12 MONTHS, YOU WORRIED THAT YOUR FOOD WOULD RUN OUT BEFORE YOU GOT MONEY TO BUY MORE.: NEVER TRUE

## 2023-08-14 SDOH — ECONOMIC STABILITY: FOOD INSECURITY: WITHIN THE PAST 12 MONTHS, THE FOOD YOU BOUGHT JUST DIDN'T LAST AND YOU DIDN'T HAVE MONEY TO GET MORE.: NEVER TRUE

## 2023-08-14 SDOH — ECONOMIC STABILITY: HOUSING INSECURITY
IN THE LAST 12 MONTHS, WAS THERE A TIME WHEN YOU DID NOT HAVE A STEADY PLACE TO SLEEP OR SLEPT IN A SHELTER (INCLUDING NOW)?: NO

## 2023-08-14 ASSESSMENT — PATIENT HEALTH QUESTIONNAIRE - PHQ9
4. FEELING TIRED OR HAVING LITTLE ENERGY: 1
SUM OF ALL RESPONSES TO PHQ QUESTIONS 1-9: 5
9. THOUGHTS THAT YOU WOULD BE BETTER OFF DEAD, OR OF HURTING YOURSELF: 0
7. TROUBLE CONCENTRATING ON THINGS, SUCH AS READING THE NEWSPAPER OR WATCHING TELEVISION: 0
SUM OF ALL RESPONSES TO PHQ QUESTIONS 1-9: 5
5. POOR APPETITE OR OVEREATING: 0
SUM OF ALL RESPONSES TO PHQ9 QUESTIONS 1 & 2: 2
8. MOVING OR SPEAKING SO SLOWLY THAT OTHER PEOPLE COULD HAVE NOTICED. OR THE OPPOSITE, BEING SO FIGETY OR RESTLESS THAT YOU HAVE BEEN MOVING AROUND A LOT MORE THAN USUAL: 0
SUM OF ALL RESPONSES TO PHQ QUESTIONS 1-9: 5
1. LITTLE INTEREST OR PLEASURE IN DOING THINGS: 0
6. FEELING BAD ABOUT YOURSELF - OR THAT YOU ARE A FAILURE OR HAVE LET YOURSELF OR YOUR FAMILY DOWN: 1
10. IF YOU CHECKED OFF ANY PROBLEMS, HOW DIFFICULT HAVE THESE PROBLEMS MADE IT FOR YOU TO DO YOUR WORK, TAKE CARE OF THINGS AT HOME, OR GET ALONG WITH OTHER PEOPLE: 1
3. TROUBLE FALLING OR STAYING ASLEEP: 1
2. FEELING DOWN, DEPRESSED OR HOPELESS: 2
SUM OF ALL RESPONSES TO PHQ QUESTIONS 1-9: 5

## 2023-08-14 ASSESSMENT — LIFESTYLE VARIABLES
HOW OFTEN DO YOU HAVE A DRINK CONTAINING ALCOHOL: 2-3 TIMES A WEEK
HOW MANY STANDARD DRINKS CONTAINING ALCOHOL DO YOU HAVE ON A TYPICAL DAY: 1 OR 2

## 2023-08-14 NOTE — PROGRESS NOTES
[employer] Annual Wellness Visit    Deandra Lopez is here for Medicare AWV and Health Maintenance (Declined colon )    Assessment & Plan   Initial Medicare annual wellness visit  Essential hypertension  The following orders have not been finalized:  -     amLODIPine (NORVASC) 5 MG tablet  Depression, unspecified depression type  The following orders have not been finalized:  -     escitalopram (LEXAPRO) 10 MG tablet  Screening mammogram for breast cancer    Recommendations for Preventive Services Due: see orders and patient instructions/AVS.  Recommended screening schedule for the next 5-10 years is provided to the patient in written form: see Patient Instructions/AVS.     No follow-ups on file. Subjective   Chief Complaint   Patient presents with    Medicare AW    Health Maintenance     Declined colon        Patient's complete Health Risk Assessment and screening values have been reviewed and are found in Flowsheets. The following problems were reviewed today and where indicated follow up appointments were made and/or referrals ordered. Positive Risk Factor Screenings with Interventions:    Fall Risk:  Do you feel unsteady or are you worried about falling? : (!) yes  2 or more falls in past year?: no  Fall with injury in past year?: no     Interventions:    See AVS for additional education material  See A/P for plan and any pertinent orders    Cognitive:    Words recalled: 1 Word Recalled   Clock Drawing Test (CDT): (!) Abnormal   Total Score: (!) 1   Total Score Interpretation: Abnormal Mini-Cog      Interventions:  See AVS for additional education material  See A/P for plan and any pertinent orders    Depression:  PHQ-2 Score: 2  PHQ-9 Total Score: 5    Interpretation:   1-4 = minimal  5-9 = mild  10-14 = moderate  15-19 = moderately severe  20-27 = severe  Interventions:  Continue lexapro            Weight and Activity:  Physical Activity: Inactive    Days of Exercise per Week: 0 days    Minutes of

## 2024-01-17 DIAGNOSIS — F32.A DEPRESSION, UNSPECIFIED DEPRESSION TYPE: ICD-10-CM

## 2024-01-18 RX ORDER — ESCITALOPRAM OXALATE 10 MG/1
TABLET ORAL
Qty: 30 TABLET | Refills: 5 | Status: SHIPPED | OUTPATIENT
Start: 2024-01-18

## 2024-01-18 NOTE — TELEPHONE ENCOUNTER
Comments:     Last Office Visit (last PCP visit):   8/14/2023    Next Visit Date:  Future Appointments   Date Time Provider Department Center   8/15/2024 11:30 AM Edouard Bowman MD [facility]ard       **If hasn't been seen in over a year OR hasn't followed up according to last diabetes/ADHD visit, make appointment for patient before sending refill to provider.    Rx requested:  Requested Prescriptions     Pending Prescriptions Disp Refills    escitalopram (LEXAPRO) 10 MG tablet [Pharmacy Med Name: escitalopram 10 mg tablet] 30 tablet 5     Sig: TAKE 1 TABLET BY MOUTH TWICE DAILY FOR 15 DAYS

## 2024-01-26 ENCOUNTER — TELEPHONE (OUTPATIENT)
Dept: FAMILY MEDICINE CLINIC | Age: 74
End: 2024-01-26

## 2024-04-05 ENCOUNTER — TELEPHONE (OUTPATIENT)
Dept: FAMILY MEDICINE CLINIC | Age: 74
End: 2024-04-05

## 2024-05-09 DIAGNOSIS — I10 ESSENTIAL HYPERTENSION: ICD-10-CM

## 2024-05-09 DIAGNOSIS — N32.81 OAB (OVERACTIVE BLADDER): ICD-10-CM

## 2024-05-09 RX ORDER — BENAZEPRIL HYDROCHLORIDE 20 MG/1
TABLET ORAL
Qty: 180 TABLET | Refills: 1 | Status: SHIPPED | OUTPATIENT
Start: 2024-05-09

## 2024-05-09 RX ORDER — OXYBUTYNIN CHLORIDE 5 MG/1
5 TABLET, EXTENDED RELEASE ORAL DAILY
Qty: 30 TABLET | Refills: 1 | Status: SHIPPED | OUTPATIENT
Start: 2024-05-09

## 2024-05-09 NOTE — TELEPHONE ENCOUNTER
Comments:     Last Office Visit (last PCP visit):   8/14/2023    Next Visit Date:  Future Appointments   Date Time Provider Department Center   8/15/2024 11:30 AM Edouard Bowman MD [facility]       **If hasn't been seen in over a year OR hasn't followed up according to last diabetes/ADHD visit, make appointment for patient before sending refill to provider.    Rx requested:  Requested Prescriptions     Pending Prescriptions Disp Refills    benazepril (LOTENSIN) 20 MG tablet [Pharmacy Med Name: benazepril 20 mg tablet] 180 tablet 1     Sig: TAKE 1 TABLET BY MOUTH TWICE DAILY    oxyBUTYnin (DITROPAN-XL) 5 MG extended release tablet [Pharmacy Med Name: oxybutynin chloride ER 5 mg tablet,extended release 24 hr] 30 tablet 1     Sig: TAKE 1 TABLET BY MOUTH DAILY

## 2024-06-07 DIAGNOSIS — F32.A DEPRESSION, UNSPECIFIED DEPRESSION TYPE: ICD-10-CM

## 2024-06-07 DIAGNOSIS — I10 ESSENTIAL HYPERTENSION: ICD-10-CM

## 2024-06-07 RX ORDER — AMLODIPINE BESYLATE 5 MG/1
5 TABLET ORAL DAILY
Qty: 30 TABLET | Refills: 5 | Status: SHIPPED | OUTPATIENT
Start: 2024-06-07

## 2024-06-07 RX ORDER — ESCITALOPRAM OXALATE 10 MG/1
TABLET ORAL
Qty: 30 TABLET | Refills: 5 | Status: SHIPPED | OUTPATIENT
Start: 2024-06-07

## 2024-06-07 NOTE — TELEPHONE ENCOUNTER
Comments:     Last Office Visit (last PCP visit):   8/14/2023    Next Visit Date:  Future Appointments   Date Time Provider Department Center   8/15/2024 11:30 AM Edouard Bowman MD [facility]       **If hasn't been seen in over a year OR hasn't followed up according to last diabetes/ADHD visit, make appointment for patient before sending refill to provider.    Rx requested:  Requested Prescriptions     Pending Prescriptions Disp Refills    amLODIPine (NORVASC) 5 MG tablet [Pharmacy Med Name: amlodipine 5 mg tablet] 30 tablet 5     Sig: TAKE 1 TABLET BY MOUTH DAILY    escitalopram (LEXAPRO) 10 MG tablet [Pharmacy Med Name: escitalopram 10 mg tablet] 30 tablet 5     Sig: TAKE 1 TABLET BY MOUTH TWICE DAILY

## 2024-06-14 ENCOUNTER — TELEPHONE (OUTPATIENT)
Dept: FAMILY MEDICINE CLINIC | Age: 74
End: 2024-06-14

## 2024-07-30 ENCOUNTER — TELEPHONE (OUTPATIENT)
Dept: FAMILY MEDICINE CLINIC | Age: 74
End: 2024-07-30

## 2024-07-30 ENCOUNTER — TELEPHONE (OUTPATIENT)
Dept: GASTROENTEROLOGY | Age: 74
End: 2024-07-30

## 2024-07-30 NOTE — TELEPHONE ENCOUNTER
Called [facility] scheduling LMOM.Please call [phone] ext [phone] schedule colon screening .

## 2024-08-22 DIAGNOSIS — N32.81 OAB (OVERACTIVE BLADDER): ICD-10-CM

## 2024-08-23 RX ORDER — OXYBUTYNIN CHLORIDE 5 MG/1
5 TABLET, EXTENDED RELEASE ORAL DAILY
Qty: 30 TABLET | Refills: 1 | Status: SHIPPED | OUTPATIENT
Start: 2024-08-23

## 2024-08-23 NOTE — TELEPHONE ENCOUNTER
Comments: LVMTCB and make appt    Last Office Visit (last PCP visit):   8/14/2023    Next Visit Date:  No future appointments.    **If hasn't been seen in over a year OR hasn't followed up according to last diabetes/ADHD visit, make appointment for patient before sending refill to provider.    Rx requested:  Requested Prescriptions     Pending Prescriptions Disp Refills    oxyBUTYnin (DITROPAN-XL) 5 MG extended release tablet [Pharmacy Med Name: oxybutynin chloride ER 5 mg tablet,extended release 24 hr] 30 tablet 1     Sig: TAKE 1 TABLET BY MOUTH EVERY DAY

## 2024-10-31 ENCOUNTER — TELEPHONE (OUTPATIENT)
Dept: FAMILY MEDICINE CLINIC | Age: 74
End: 2024-10-31

## 2024-11-02 DIAGNOSIS — N32.81 OAB (OVERACTIVE BLADDER): ICD-10-CM

## 2024-11-02 DIAGNOSIS — F32.A DEPRESSION, UNSPECIFIED DEPRESSION TYPE: ICD-10-CM

## 2024-11-04 RX ORDER — OXYBUTYNIN CHLORIDE 5 MG/1
5 TABLET, EXTENDED RELEASE ORAL DAILY
Qty: 30 TABLET | Refills: 1 | Status: SHIPPED | OUTPATIENT
Start: 2024-11-04

## 2024-11-04 RX ORDER — ESCITALOPRAM OXALATE 10 MG/1
TABLET ORAL
Qty: 30 TABLET | Refills: 1 | Status: SHIPPED | OUTPATIENT
Start: 2024-11-04

## 2024-11-04 NOTE — TELEPHONE ENCOUNTER
Comments: LMTCB regarding appt    Last Office Visit (last PCP visit):   8/14/2023    Next Visit Date:  No future appointments.    **If hasn't been seen in over a year OR hasn't followed up according to last diabetes/ADHD visit, make appointment for patient before sending refill to provider.    Rx requested:  Requested Prescriptions     Pending Prescriptions Disp Refills    escitalopram (LEXAPRO) 10 MG tablet [Pharmacy Med Name: escitalopram 10 mg tablet] 30 tablet 5     Sig: TAKE 1 TABLET BY MOUTH TWICE DAILY    oxyBUTYnin (DITROPAN-XL) 5 MG extended release tablet [Pharmacy Med Name: oxybutynin chloride ER 5 mg tablet,extended release 24 hr] 30 tablet 1     Sig: TAKE 1 TABLET BY MOUTH EVERY DAY

## 2025-04-28 DIAGNOSIS — F32.A DEPRESSION, UNSPECIFIED DEPRESSION TYPE: ICD-10-CM

## 2025-04-28 RX ORDER — ESCITALOPRAM OXALATE 10 MG/1
10 TABLET ORAL 2 TIMES DAILY
Qty: 30 TABLET | Refills: 1 | Status: SHIPPED | OUTPATIENT
Start: 2025-04-28 | End: 2025-05-01 | Stop reason: SDUPTHER

## 2025-05-01 ENCOUNTER — OFFICE VISIT (OUTPATIENT)
Dept: FAMILY MEDICINE CLINIC | Age: 75
End: 2025-05-01
Payer: COMMERCIAL

## 2025-05-01 VITALS
WEIGHT: 86.8 LBS | DIASTOLIC BLOOD PRESSURE: 76 MMHG | HEIGHT: 59 IN | SYSTOLIC BLOOD PRESSURE: 130 MMHG | OXYGEN SATURATION: 99 % | TEMPERATURE: 98.3 F | HEART RATE: 102 BPM | BODY MASS INDEX: 17.5 KG/M2

## 2025-05-01 DIAGNOSIS — F32.A DEPRESSION, UNSPECIFIED DEPRESSION TYPE: ICD-10-CM

## 2025-05-01 DIAGNOSIS — E55.9 VITAMIN D DEFICIENCY: ICD-10-CM

## 2025-05-01 DIAGNOSIS — Z00.00 MEDICARE ANNUAL WELLNESS VISIT, SUBSEQUENT: Primary | ICD-10-CM

## 2025-05-01 DIAGNOSIS — M81.0 OSTEOPOROSIS, UNSPECIFIED OSTEOPOROSIS TYPE, UNSPECIFIED PATHOLOGICAL FRACTURE PRESENCE: ICD-10-CM

## 2025-05-01 DIAGNOSIS — I10 ESSENTIAL HYPERTENSION: ICD-10-CM

## 2025-05-01 DIAGNOSIS — N32.81 OAB (OVERACTIVE BLADDER): ICD-10-CM

## 2025-05-01 PROCEDURE — 3078F DIAST BP <80 MM HG: CPT | Performed by: FAMILY MEDICINE

## 2025-05-01 PROCEDURE — 1159F MED LIST DOCD IN RCRD: CPT | Performed by: FAMILY MEDICINE

## 2025-05-01 PROCEDURE — 1125F AMNT PAIN NOTED PAIN PRSNT: CPT | Performed by: FAMILY MEDICINE

## 2025-05-01 PROCEDURE — 1160F RVW MEDS BY RX/DR IN RCRD: CPT | Performed by: FAMILY MEDICINE

## 2025-05-01 PROCEDURE — 1123F ACP DISCUSS/DSCN MKR DOCD: CPT | Performed by: FAMILY MEDICINE

## 2025-05-01 PROCEDURE — 3075F SYST BP GE 130 - 139MM HG: CPT | Performed by: FAMILY MEDICINE

## 2025-05-01 PROCEDURE — G0439 PPPS, SUBSEQ VISIT: HCPCS | Performed by: FAMILY MEDICINE

## 2025-05-01 RX ORDER — ESCITALOPRAM OXALATE 10 MG/1
10 TABLET ORAL 2 TIMES DAILY
Qty: 90 TABLET | Refills: 2 | Status: SHIPPED | OUTPATIENT
Start: 2025-05-01

## 2025-05-01 RX ORDER — OXYBUTYNIN CHLORIDE 5 MG/1
5 TABLET, EXTENDED RELEASE ORAL DAILY
Qty: 90 TABLET | Refills: 2 | Status: SHIPPED | OUTPATIENT
Start: 2025-05-01

## 2025-05-01 RX ORDER — AMLODIPINE BESYLATE 5 MG/1
5 TABLET ORAL DAILY
Qty: 90 TABLET | Refills: 2 | Status: SHIPPED | OUTPATIENT
Start: 2025-05-01

## 2025-05-01 RX ORDER — BENAZEPRIL HYDROCHLORIDE 20 MG/1
TABLET ORAL
Qty: 180 TABLET | Refills: 2 | Status: SHIPPED | OUTPATIENT
Start: 2025-05-01

## 2025-05-01 SDOH — ECONOMIC STABILITY: FOOD INSECURITY: WITHIN THE PAST 12 MONTHS, YOU WORRIED THAT YOUR FOOD WOULD RUN OUT BEFORE YOU GOT MONEY TO BUY MORE.: NEVER TRUE

## 2025-05-01 SDOH — ECONOMIC STABILITY: FOOD INSECURITY: WITHIN THE PAST 12 MONTHS, THE FOOD YOU BOUGHT JUST DIDN'T LAST AND YOU DIDN'T HAVE MONEY TO GET MORE.: NEVER TRUE

## 2025-05-01 ASSESSMENT — PATIENT HEALTH QUESTIONNAIRE - PHQ9
2. FEELING DOWN, DEPRESSED OR HOPELESS: SEVERAL DAYS
4. FEELING TIRED OR HAVING LITTLE ENERGY: MORE THAN HALF THE DAYS
SUM OF ALL RESPONSES TO PHQ QUESTIONS 1-9: 8
7. TROUBLE CONCENTRATING ON THINGS, SUCH AS READING THE NEWSPAPER OR WATCHING TELEVISION: SEVERAL DAYS
3. TROUBLE FALLING OR STAYING ASLEEP: MORE THAN HALF THE DAYS
10. IF YOU CHECKED OFF ANY PROBLEMS, HOW DIFFICULT HAVE THESE PROBLEMS MADE IT FOR YOU TO DO YOUR WORK, TAKE CARE OF THINGS AT HOME, OR GET ALONG WITH OTHER PEOPLE: SOMEWHAT DIFFICULT
1. LITTLE INTEREST OR PLEASURE IN DOING THINGS: NOT AT ALL
8. MOVING OR SPEAKING SO SLOWLY THAT OTHER PEOPLE COULD HAVE NOTICED. OR THE OPPOSITE, BEING SO FIGETY OR RESTLESS THAT YOU HAVE BEEN MOVING AROUND A LOT MORE THAN USUAL: SEVERAL DAYS
9. THOUGHTS THAT YOU WOULD BE BETTER OFF DEAD, OR OF HURTING YOURSELF: NOT AT ALL
SUM OF ALL RESPONSES TO PHQ QUESTIONS 1-9: 8
6. FEELING BAD ABOUT YOURSELF - OR THAT YOU ARE A FAILURE OR HAVE LET YOURSELF OR YOUR FAMILY DOWN: NOT AT ALL
SUM OF ALL RESPONSES TO PHQ QUESTIONS 1-9: 8
5. POOR APPETITE OR OVEREATING: SEVERAL DAYS
SUM OF ALL RESPONSES TO PHQ QUESTIONS 1-9: 8

## 2025-05-01 NOTE — PROGRESS NOTES
Site: Left Upper Arm   Pulse: (!) 102   Temp: 98.3 °F (36.8 °C)   SpO2: 99%   Weight: 39.4 kg (86 lb 12.8 oz)   Height: 1.499 m (4' 11\")      Body mass index is 17.53 kg/m².        Gen: Well, NAD, Alert, Oriented x 3   HEENT: EOMI, eyes clear, MMM  Skin: without rash or jaundice  Neck: no significant lymphadenopathy or thyromegaly  Lungs: CTA B w/out Rales/Wheezes/Rhonchi, Good respiratory effort   Heart: RRR, S1S2, w/out M/R/G, non-displaced PMI   Ext: No C/C/E Bilaterally.   Neuro: Neurovascularly intact w/ Sensory/Motor intact UE/LE Bilaterally.         No Known Allergies  Prior to Visit Medications    Medication Sig Taking? Authorizing Provider   Handicap Placard MISC by Does not apply route Exp 10 year Yes Edouard Bowman MD   oxyBUTYnin (DITROPAN-XL) 5 MG extended release tablet Take 1 tablet by mouth daily Yes Edouard Bowman MD   amLODIPine (NORVASC) 5 MG tablet Take 1 tablet by mouth daily Yes Edouard Bowman MD   benazepril (LOTENSIN) 20 MG tablet TAKE 1 TABLET BY MOUTH TWICE DAILY Yes Edouard Bowman MD   escitalopram (LEXAPRO) 10 MG tablet Take 1 tablet by mouth 2 times daily Yes Edouard Bowman MD   Ascorbic Acid (VITAMIN C) 250 MG tablet Take 1 tablet by mouth daily Yes Ophelia Lloyd MD   Cholecalciferol (VITAMIN D) 50 MCG (2000 UT) CAPS capsule Take by mouth Yes Provider, Historical, MD   Handicap Placard MISC by Does not apply route Exp 1 year Yes Edouard Bowman MD   naproxen (NAPROSYN) 250 MG tablet Take 1 tablet by mouth 2 times daily (with meals) Yes Ophelia Lloyd MD CareTeam (Including outside providers/suppliers regularly involved in providing care):   Patient Care Team:  Edouard Bowman MD as PCP - General  Edouard Bowman MD as PCP - Empaneled Provider     Recommendations for Preventive Services Due: see orders and patient instructions/AVS.  Recommended screening schedule for the next 5-10 years is provided to the patient in written form: see Patient Instructions/AVS.     Reviewed and

## (undated) DEVICE — KIT,ANTI FOG,W/SPONGE & FLUID,SOFT PACK: Brand: MEDLINE

## (undated) DEVICE — GLOVE ORANGE PI 8   MSG9080

## (undated) DEVICE — COVER LT HNDL BLU PLAS

## (undated) DEVICE — Device

## (undated) DEVICE — PACK,BASIC IV,SIRUS: Brand: MEDLINE

## (undated) DEVICE — SPONGE,LAP,18"X18",DLX,XR,ST,5/PK,40/PK: Brand: MEDLINE

## (undated) DEVICE — GOWN,SIRUS,NONRNF,SETINSLV,2XL,18/CS: Brand: MEDLINE

## (undated) DEVICE — BLADE ES ELASTOMERIC COAT INSUL DURABLE BEND UPTO 90DEG

## (undated) DEVICE — DEVICE SUT SUT 2-0 L48IN NDL ES-9 GRN POLY BRAID LD UNIT

## (undated) DEVICE — TROCARS: Brand: KII® BALLOON BLUNT TIP SYSTEM

## (undated) DEVICE — GAUZE,SPONGE,4"X4",16PLY,XRAY,STRL,LF: Brand: MEDLINE

## (undated) DEVICE — DEVICE SUT 0 L48IN GRN POLY BRAID LD UNIT DISP SURGDAC

## (undated) DEVICE — SINGLE PORT MANIFOLD: Brand: NEPTUNE 2

## (undated) DEVICE — INTENDED FOR TISSUE SEPARATION, AND OTHER PROCEDURES THAT REQUIRE A SHARP SURGICAL BLADE TO PUNCTURE OR CUT.: Brand: BARD-PARKER ® CARBON RIB-BACK BLADES

## (undated) DEVICE — SUTURE SZ 0 27IN 5/8 CIR UR-6  TAPER PT VIOLET ABSRB VICRYL J603H

## (undated) DEVICE — SHEET,DRAPE,53X77,STERILE: Brand: MEDLINE

## (undated) DEVICE — APPLICATOR MEDICATED 26 CC SOLUTION HI LT ORNG CHLORAPREP

## (undated) DEVICE — SUTURE PERMA-HAND SZ 3-0 L18IN NONABSORBABLE BLK SH-1 L22MM C003D

## (undated) DEVICE — LAPAROSCOPIC TROCAR SLEEVE/SINGLE USE: Brand: KII® OPTICAL ACCESS SYSTEM

## (undated) DEVICE — TOWEL,OR,DSP,ST,BLUE,STD,4/PK,20PK/CS: Brand: MEDLINE

## (undated) DEVICE — SYRINGE IRRIG 60ML SFT PLIABLE BLB EZ TO GRP 1 HND USE W/

## (undated) DEVICE — LABEL MED MINI W/ MARKER

## (undated) DEVICE — WARMER SCP 2 ANTIFOG LAP DISP

## (undated) DEVICE — DRESSING COMP W4XL4IN N ADH PD W2.5XL2.5IN GZ BORDERED ADH

## (undated) DEVICE — DEVICE SUT SHFT L34CM DIA 10MM 2 JAW LD UNIT ENDOSTCH

## (undated) DEVICE — NEPTUNE E-SEP SMOKE EVACUATION PENCIL, COATED, 70MM BLADE, PUSH BUTTON SWITCH: Brand: NEPTUNE E-SEP

## (undated) DEVICE — ELECTRODE PT RET AD L9FT HI MOIST COND ADH HYDRGEL CORDED

## (undated) DEVICE — GOWN,AURORA,NONREINFORCED,LARGE: Brand: MEDLINE

## (undated) DEVICE — Z DUPLICATE USE 2431315 SET INSUF TBNG HI FLO W/ SMK EVAC FOR PNEUMOCLEAR

## (undated) DEVICE — TROCAR: Brand: KII® SLEEVE

## (undated) DEVICE — GAUZE 2X2IN ST BORDERED

## (undated) DEVICE — MAGNETIC INSTR DRAPE 20X16: Brand: MEDLINE INDUSTRIES, INC.

## (undated) DEVICE — DRAPE,LAP,CHOLE,W/TROUGHS,STERILE: Brand: MEDLINE

## (undated) DEVICE — SEALER LAP L37CM MARYLAND JAW OPN NANO COAT MULTIFUNCTIONAL

## (undated) DEVICE — COUNTER NDL 40 COUNT HLD 70 FOAM BLK ADH W/ MAG

## (undated) DEVICE — SUTURE VCRL SZ 4-0 L18IN ABSRB UD L19MM PS-2 3/8 CIR PRIM J496H

## (undated) DEVICE — KIT DRN FLAT W/ 100CC EVAC 10MM FULL PERF